# Patient Record
Sex: FEMALE | Race: WHITE | NOT HISPANIC OR LATINO | Employment: OTHER | URBAN - METROPOLITAN AREA
[De-identification: names, ages, dates, MRNs, and addresses within clinical notes are randomized per-mention and may not be internally consistent; named-entity substitution may affect disease eponyms.]

---

## 2019-12-31 ENCOUNTER — OFFICE VISIT (OUTPATIENT)
Dept: UROLOGY | Facility: CLINIC | Age: 69
End: 2019-12-31
Payer: MEDICARE

## 2019-12-31 ENCOUNTER — LAB VISIT (OUTPATIENT)
Dept: LAB | Facility: HOSPITAL | Age: 69
End: 2019-12-31
Attending: UROLOGY
Payer: MEDICARE

## 2019-12-31 VITALS
SYSTOLIC BLOOD PRESSURE: 138 MMHG | HEART RATE: 72 BPM | DIASTOLIC BLOOD PRESSURE: 66 MMHG | HEIGHT: 60 IN | BODY MASS INDEX: 35.05 KG/M2 | WEIGHT: 178.56 LBS

## 2019-12-31 DIAGNOSIS — N20.0 KIDNEY STONES: ICD-10-CM

## 2019-12-31 DIAGNOSIS — N20.0 KIDNEY STONES: Primary | ICD-10-CM

## 2019-12-31 LAB
ANION GAP SERPL CALC-SCNC: 6 MMOL/L (ref 8–16)
BACTERIA #/AREA URNS AUTO: ABNORMAL /HPF
BASOPHILS # BLD AUTO: 0.02 K/UL (ref 0–0.2)
BASOPHILS NFR BLD: 0.3 % (ref 0–1.9)
BILIRUB UR QL STRIP: NEGATIVE
BUN SERPL-MCNC: 13 MG/DL (ref 8–23)
CALCIUM SERPL-MCNC: 9.3 MG/DL (ref 8.7–10.5)
CHLORIDE SERPL-SCNC: 106 MMOL/L (ref 95–110)
CLARITY UR REFRACT.AUTO: ABNORMAL
CO2 SERPL-SCNC: 29 MMOL/L (ref 23–29)
COLOR UR AUTO: YELLOW
CREAT SERPL-MCNC: 0.8 MG/DL (ref 0.5–1.4)
DIFFERENTIAL METHOD: ABNORMAL
EOSINOPHIL # BLD AUTO: 0 K/UL (ref 0–0.5)
EOSINOPHIL NFR BLD: 0.2 % (ref 0–8)
ERYTHROCYTE [DISTWIDTH] IN BLOOD BY AUTOMATED COUNT: 12.4 % (ref 11.5–14.5)
EST. GFR  (AFRICAN AMERICAN): >60 ML/MIN/1.73 M^2
EST. GFR  (NON AFRICAN AMERICAN): >60 ML/MIN/1.73 M^2
GLUCOSE SERPL-MCNC: 103 MG/DL (ref 70–110)
GLUCOSE UR QL STRIP: NEGATIVE
HCT VFR BLD AUTO: 42.3 % (ref 37–48.5)
HGB BLD-MCNC: 13 G/DL (ref 12–16)
HGB UR QL STRIP: ABNORMAL
IMM GRANULOCYTES # BLD AUTO: 0.01 K/UL (ref 0–0.04)
IMM GRANULOCYTES NFR BLD AUTO: 0.2 % (ref 0–0.5)
KETONES UR QL STRIP: NEGATIVE
LEUKOCYTE ESTERASE UR QL STRIP: ABNORMAL
LYMPHOCYTES # BLD AUTO: 2 K/UL (ref 1–4.8)
LYMPHOCYTES NFR BLD: 30.3 % (ref 18–48)
MCH RBC QN AUTO: 27.9 PG (ref 27–31)
MCHC RBC AUTO-ENTMCNC: 30.7 G/DL (ref 32–36)
MCV RBC AUTO: 91 FL (ref 82–98)
MICROSCOPIC COMMENT: ABNORMAL
MONOCYTES # BLD AUTO: 0.5 K/UL (ref 0.3–1)
MONOCYTES NFR BLD: 7.1 % (ref 4–15)
NEUTROPHILS # BLD AUTO: 4.1 K/UL (ref 1.8–7.7)
NEUTROPHILS NFR BLD: 61.9 % (ref 38–73)
NITRITE UR QL STRIP: NEGATIVE
NRBC BLD-RTO: 0 /100 WBC
PH UR STRIP: 5 [PH] (ref 5–8)
PLATELET # BLD AUTO: 170 K/UL (ref 150–350)
PMV BLD AUTO: 11.9 FL (ref 9.2–12.9)
POTASSIUM SERPL-SCNC: 4 MMOL/L (ref 3.5–5.1)
PROT UR QL STRIP: NEGATIVE
RBC # BLD AUTO: 4.66 M/UL (ref 4–5.4)
RBC #/AREA URNS AUTO: 4 /HPF (ref 0–4)
SODIUM SERPL-SCNC: 141 MMOL/L (ref 136–145)
SP GR UR STRIP: 1.02 (ref 1–1.03)
SQUAMOUS #/AREA URNS AUTO: 2 /HPF
URN SPEC COLLECT METH UR: ABNORMAL
WBC # BLD AUTO: 6.63 K/UL (ref 3.9–12.7)
WBC #/AREA URNS AUTO: 66 /HPF (ref 0–5)

## 2019-12-31 PROCEDURE — 85025 COMPLETE CBC W/AUTO DIFF WBC: CPT

## 2019-12-31 PROCEDURE — 99204 PR OFFICE/OUTPT VISIT, NEW, LEVL IV, 45-59 MIN: ICD-10-PCS | Mod: S$GLB,,, | Performed by: UROLOGY

## 2019-12-31 PROCEDURE — 36415 COLL VENOUS BLD VENIPUNCTURE: CPT

## 2019-12-31 PROCEDURE — 99999 PR PBB SHADOW E&M-NEW PATIENT-LVL III: ICD-10-PCS | Mod: PBBFAC,,, | Performed by: UROLOGY

## 2019-12-31 PROCEDURE — 99999 PR PBB SHADOW E&M-NEW PATIENT-LVL III: CPT | Mod: PBBFAC,,, | Performed by: UROLOGY

## 2019-12-31 PROCEDURE — 1159F PR MEDICATION LIST DOCUMENTED IN MEDICAL RECORD: ICD-10-PCS | Mod: S$GLB,,, | Performed by: UROLOGY

## 2019-12-31 PROCEDURE — 1101F PR PT FALLS ASSESS DOC 0-1 FALLS W/OUT INJ PAST YR: ICD-10-PCS | Mod: CPTII,S$GLB,, | Performed by: UROLOGY

## 2019-12-31 PROCEDURE — 87086 URINE CULTURE/COLONY COUNT: CPT

## 2019-12-31 PROCEDURE — 1126F AMNT PAIN NOTED NONE PRSNT: CPT | Mod: S$GLB,,, | Performed by: UROLOGY

## 2019-12-31 PROCEDURE — 1101F PT FALLS ASSESS-DOCD LE1/YR: CPT | Mod: CPTII,S$GLB,, | Performed by: UROLOGY

## 2019-12-31 PROCEDURE — 81001 URINALYSIS AUTO W/SCOPE: CPT

## 2019-12-31 PROCEDURE — 99204 OFFICE O/P NEW MOD 45 MIN: CPT | Mod: S$GLB,,, | Performed by: UROLOGY

## 2019-12-31 PROCEDURE — 87186 SC STD MICRODIL/AGAR DIL: CPT

## 2019-12-31 PROCEDURE — 87088 URINE BACTERIA CULTURE: CPT

## 2019-12-31 PROCEDURE — 80048 BASIC METABOLIC PNL TOTAL CA: CPT

## 2019-12-31 PROCEDURE — 1126F PR PAIN SEVERITY QUANTIFIED, NO PAIN PRESENT: ICD-10-PCS | Mod: S$GLB,,, | Performed by: UROLOGY

## 2019-12-31 PROCEDURE — 87077 CULTURE AEROBIC IDENTIFY: CPT

## 2019-12-31 PROCEDURE — 1159F MED LIST DOCD IN RCRD: CPT | Mod: S$GLB,,, | Performed by: UROLOGY

## 2019-12-31 RX ORDER — LISINOPRIL 5 MG/1
TABLET ORAL DAILY
COMMUNITY
Start: 2019-10-30

## 2019-12-31 RX ORDER — POLYETHYLENE GLYCOL-3350 AND ELECTROLYTES 236; 6.74; 5.86; 2.97; 22.74 G/274.31G; G/274.31G; G/274.31G; G/274.31G; G/274.31G
POWDER, FOR SOLUTION ORAL
COMMUNITY
Start: 2019-09-25 | End: 2020-02-03 | Stop reason: CLARIF

## 2019-12-31 RX ORDER — ATORVASTATIN CALCIUM 10 MG/1
TABLET, FILM COATED ORAL NIGHTLY
COMMUNITY
Start: 2019-10-31

## 2019-12-31 RX ORDER — PANTOPRAZOLE SODIUM 40 MG/1
TABLET, DELAYED RELEASE ORAL
COMMUNITY
Start: 2019-12-08

## 2019-12-31 RX ORDER — CETIRIZINE HYDROCHLORIDE 10 MG/1
10 TABLET ORAL NIGHTLY
COMMUNITY
Start: 2019-10-28

## 2019-12-31 NOTE — PROGRESS NOTES
Urology - [facility]      SUBJECTIVE:     Chief Complaint: right UPJ stone    History of Present Illness:  Kaylin Freire is a 69 y.o. female who presents to clinic for eval of right UPJ stone. She is New  to our clinic. Referral from CHRISTINE Aguiar Jr., MD     Patient noted to have hydro on lung screening CT and underwent abdominal CT which showed a 2.5 cm left UPJ stone with severe hydro, no renal masses, bilateral kidneys appear mildly atrophic.     She is currently asymptomatic. Has had 1 stone episode 15 years ago which she passed. Both parents have hx of stones. Takes a multivitamin, drinks lots of water.   Complains of frequency and urgency. No gross hematuria.   Hx of recurrent UTIs.    PMHx: HTN, HLD, COPD  PSurgHx: hysterectomy, rotator cuff x2    Review of patient's allergies indicates:   Allergen Reactions    Penicillins Swelling       Past Medical History:   Diagnosis Date    Allergy     COPD (chronic obstructive pulmonary disease)     GERD (gastroesophageal reflux disease)     High cholesterol     HTN (hypertension)     Renal mass      Past Surgical History:   Procedure Laterality Date    BREAST BIOPSY      HYSTERECTOMY      ROTATOR CUFF REPAIR       History reviewed. No pertinent family history.  Social History     Tobacco Use    Smoking status: Never Smoker    Smokeless tobacco: Never Used   Substance Use Topics    Alcohol use: Not Currently     Frequency: Never    Drug use: Never        Review of Systems   Constitutional: Negative for chills and fever.   HENT: Negative for trouble swallowing.    Eyes: Negative for pain.   Respiratory: Negative for shortness of breath.    Cardiovascular: Negative for chest pain and palpitations.   Gastrointestinal: Negative for abdominal pain, nausea and vomiting.   Genitourinary: Positive for frequency and urgency. Negative for difficulty urinating and hematuria.   Neurological: Negative for weakness.   Psychiatric/Behavioral: Negative for  behavioral problems.       OBJECTIVE:     Anticoagulation:  Yes aspirin 81 mg    Estimated body mass index is 34.88 kg/m² as calculated from the following:    Height as of this encounter: 5' (1.524 m).    Weight as of this encounter: 81 kg (178 lb 9.2 oz).    Vital Signs (Most Recent)  Pulse: 72 (12/31/19 0925)  BP: 138/66 (12/31/19 0925)    Physical Exam   Constitutional: She is oriented to person, place, and time. She appears well-developed and well-nourished. No distress.   HENT:   Head: Normocephalic and atraumatic.   Eyes: No scleral icterus.   Neck: No JVD present.   Cardiovascular: Normal rate and regular rhythm.    Pulmonary/Chest: Effort normal. No respiratory distress.   Abdominal: Soft. She exhibits no distension. There is no tenderness.   Genitourinary:   Genitourinary Comments: No CVA tenderness bilaterally   Neurological: She is alert and oriented to person, place, and time.   Skin: She is not diaphoretic.     Psychiatric: She has a normal mood and affect. Her behavior is normal. Thought content normal.     Imaging:  Per HPI    ASSESSMENT     1. Kidney stones      PLAN:     - Discussed options for stone management. Recommended PCNL given size of stone. Patient is agreeable to this.   - I have explained the indication, risks, benefits, and alternatives of the procedure in detail.  The patient voices understanding and all questions have been answered.   Risks including but not limited to bleeding, infection, injury to the lung, liver, spleen, colon, need for additional procedures, incomplete removal of stone, arteriovenous malformation, pseudoaneurysm, hydrothorax or pneumothorax, urinoma, ureteral injury or perforation, DVT, PE, heart attack, stroke, and death were discussed with the patient in depth.  The patient agrees to proceed as planned with left PCNL.  - Will meet with Radha today to discuss scheduling her surgery.  - CBC, BMP, UA, urine culture today    Esperanza Lopez MD     Letter to Cosme  CHRISTINE Khanna Jr., MD

## 2019-12-31 NOTE — LETTER
December 31, 2019      CHRISTINE Aguiar Jr., MD  [address] [place] [address]           [facility]  [address]  [facility] [address]  Phone: [phone]          Patient: Kaylin Freire   MR Number: [id number]   YOB: 1950   Date of Visit: 12/31/2019       Dear Dr. CHRISTINE Aguiar Jr.:    Thank you for referring Kaylin Freire to me for evaluation. Attached you will find relevant portions of my assessment and plan of care.    If you have questions, please do not hesitate to call me. I look forward to following Kaylin Freire along with you.    Sincerely,    Arjun Montejo MD    Enclosure  CC:  No Recipients    If you would like to receive this communication electronically, please contact [email or web address] or [phone] to request more information on [employer] access.    For providers and/or their staff who would like to refer a patient to [facility], please contact us through our one-stop-shop provider referral line, [facility], at [phone].    If you feel you have received this communication in error or would no longer like to receive these types of communications, please e-mail [email or web address]

## 2020-01-02 ENCOUNTER — TELEPHONE (OUTPATIENT)
Dept: UROLOGY | Facility: CLINIC | Age: 70
End: 2020-01-02

## 2020-01-02 RX ORDER — CIPROFLOXACIN 500 MG/1
500 TABLET ORAL 2 TIMES DAILY
Qty: 28 TABLET | Refills: 0 | Status: SHIPPED | OUTPATIENT
Start: 2020-01-02 | End: 2020-01-16

## 2020-01-02 NOTE — TELEPHONE ENCOUNTER
Spoke to pt. Informed of postiove urine culture and that Rx was sent to pharmacy to start taking.  anthony spears lpn

## 2020-01-03 LAB — BACTERIA UR CULT: ABNORMAL

## 2020-01-15 ENCOUNTER — TELEPHONE (OUTPATIENT)
Dept: INTERVENTIONAL RADIOLOGY/VASCULAR | Facility: HOSPITAL | Age: 70
End: 2020-01-15

## 2020-01-16 ENCOUNTER — TELEPHONE (OUTPATIENT)
Dept: UROLOGY | Facility: CLINIC | Age: 70
End: 2020-01-16

## 2020-01-16 DIAGNOSIS — N20.0 KIDNEY STONES: Primary | ICD-10-CM

## 2020-02-03 RX ORDER — ASPIRIN 81 MG/1
81 TABLET ORAL DAILY
COMMUNITY
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
## 2020-02-04 DIAGNOSIS — N39.0 URINARY TRACT INFECTION WITHOUT HEMATURIA, SITE UNSPECIFIED: ICD-10-CM

## 2020-02-04 DIAGNOSIS — Z01.818 PREOP TESTING: Primary | ICD-10-CM

## 2020-02-12 ENCOUNTER — OFFICE VISIT (OUTPATIENT)
Dept: UROLOGY | Facility: CLINIC | Age: 70
DRG: 661 | End: 2020-02-12
Payer: MEDICARE

## 2020-02-12 DIAGNOSIS — N20.1 LEFT URETERAL STONE: Primary | ICD-10-CM

## 2020-02-12 PROCEDURE — 99499 NO LOS: ICD-10-PCS | Mod: S$GLB,,, | Performed by: UROLOGY

## 2020-02-12 PROCEDURE — 99999 PR PBB SHADOW E&M-EST. PATIENT-LVL II: CPT | Mod: PBBFAC,,,

## 2020-02-12 PROCEDURE — 87086 URINE CULTURE/COLONY COUNT: CPT

## 2020-02-12 PROCEDURE — 99499 UNLISTED E&M SERVICE: CPT | Mod: S$GLB,,, | Performed by: UROLOGY

## 2020-02-12 PROCEDURE — 99999 PR PBB SHADOW E&M-EST. PATIENT-LVL II: ICD-10-PCS | Mod: PBBFAC,,,

## 2020-02-12 NOTE — PROGRESS NOTES
Urology ([facility]) H&P  Staff: Arjun Montejo MD    CC: Left UPJ stone    HPI:  Kaylin Freire is a 69 y.o. female with a left UPJ stone presenting pre-op for left PCNL.    Patient has a 2.4 cm left UPJ stone with severe proximal hydronephrosis. She denies flank pain, suprapubic pain, dysuria, gross hematuria. She has OAB. She endorses daytime frequency x5-6/day, urgency, UUI x1-2/day, and nocturia x2. She has to change her depends 3x per day. She has tried and failed two separate OAB meds.    She has a history of HTN, HLD, GERD, and COPD. Denies dyspnea or angina on exertion.  Former 1.5 ppd smoker for 30 years. Quit 7 years ago.  No prior stone surgeries. Prior abdominal surgeries include open appendectomy and open partial hysterectomy.    ROS:  Neg except per HPI    Past Medical History:   Diagnosis Date    Allergy     COPD (chronic obstructive pulmonary disease)     General anesthetics causing adverse effect in therapeutic use     Sometimes hard to wake up from anesthesia-following left Rotator cuff sx    GERD (gastroesophageal reflux disease)     High cholesterol     HTN (hypertension)     Renal mass        Past Surgical History:   Procedure Laterality Date    APPENDECTOMY      BREAST BIOPSY      HYSTERECTOMY      ROTATOR CUFF REPAIR Bilateral        Social History     Socioeconomic History    Marital status: Unknown     Spouse name: Not on file    Number of children: Not on file    Years of education: Not on file    Highest education level: Not on file   Occupational History    Not on file   Social Needs    Financial resource strain: Not on file    Food insecurity:     Worry: Not on file     Inability: Not on file    Transportation needs:     Medical: Not on file     Non-medical: Not on file   Tobacco Use    Smoking status: Former Smoker     Packs/day: 1.50     Years: 35.00     Pack years: 52.50     Types: Cigarettes    Smokeless tobacco: Never Used    Tobacco comment: quit smoking x7  years ago   Substance and Sexual Activity    Alcohol use: Never     Frequency: Never    Drug use: Never    Sexual activity: Not on file   Lifestyle    Physical activity:     Days per week: Not on file     Minutes per session: Not on file    Stress: Not on file   Relationships    Social connections:     Talks on phone: Not on file     Gets together: Not on file     Attends Sabianist service: Not on file     Active member of club or organization: Not on file     Attends meetings of clubs or organizations: Not on file     Relationship status: Not on file   Other Topics Concern    Not on file   Social History Narrative    Not on file       Family History   Problem Relation Age of Onset    Anesthesia problems Neg Hx        Review of patient's allergies indicates:   Allergen Reactions    Penicillins Swelling    Adhesive Rash       Current Outpatient Medications on File Prior to Visit   Medication Sig Dispense Refill    aspirin (ECOTRIN) 81 MG EC tablet Take 81 mg by mouth once daily.      atorvastatin (LIPITOR) 10 MG tablet every evening.       cetirizine (ZYRTEC) 10 MG tablet Take 10 mg by mouth nightly.      lisinopril (PRINIVIL,ZESTRIL) 5 MG tablet once daily.       pantoprazole (PROTONIX) 40 MG tablet        No current facility-administered medications on file prior to visit.        Anticoagulation:  Yes - ASA 81 for prevention    Physical Exam:  General: No acute distress, well developed. AAOx3  Head: Normocephalic, Atraumatic  Eyes: Extra-occular movements intact, No discharge  Neck: supple, symmetrical, trachea midline  Lungs: normal respiratory effort, no respiratory distress, no wheezes  CV: regular rate, 2+ pulses  Abdomen: soft, non-tender, non-distended, no organomegaly, no CVA tenderness bilaterally  MSK: no edema, no deformities, normal ROM  Skin: skin color, texture, turgor normal.  Neurologic: no focal deficits, sensation intact    Labs:    Urine dipstick today - Negative for all tested  components.    Urine culture (12/31/19) - Pansensitive E coli    Lab Results   Component Value Date    WBC 6.63 12/31/2019    HGB 13.0 12/31/2019    HCT 42.3 12/31/2019    MCV 91 12/31/2019     12/31/2019           BMP  Lab Results   Component Value Date     12/31/2019    K 4.0 12/31/2019     12/31/2019    CO2 29 12/31/2019    BUN 13 12/31/2019    CREATININE 0.8 12/31/2019    CALCIUM 9.3 12/31/2019    ANIONGAP 6 (L) 12/31/2019    ESTGFRAFRICA >60.0 12/31/2019    EGFRNONAA >60.0 12/31/2019       Imaging:    CT Abdomen/Pelvis without Contrast (12/6/19):  - Bilateral adrenals unremarkable  - 21x24 mm left UPJ stone with proximal severe hydronephrosis, 1423 HU, SSD 14.7 mm  - 5.1x4.7 mm smaller left UPJ stone  - No left renal masses  - No right renal stones, masses, or hydronephrosis  - Right ureter normal in course and caliber  - Bladder uremarkable    Assessment: Kaylin Freire is a 69 y.o. female with a left UPJ stone presenting pre-op for left PCNL.    Plan:     1. To OR on 2/14/20 for left PCNL.  2. Consents signed   3. I have explained the risk, benefits, and alternatives of the procedure in detail. The patient voices understanding and all questions have been answered. The patient agrees to proceed as planned.   4. Urine sent for culture  5. Pre-operative cardiology clearance and aspirin instructions uploaded in media tab. She is low cardiac risk and may hold ASA 81 for 5-7 days pre-op.  6. Last took ASA 81 on Monday, 2/10 which will have her off of ASA for only 4 full days pre-op. After discussion with Dr. Montejo, still OK to proceed with surgery despite this.    Raphael Esparza MD

## 2020-02-12 NOTE — H&P (VIEW-ONLY)
Urology ([facility]) H&P  Staff: Arjun Montejo MD    CC: Left UPJ stone    HPI:  Kaylin Freire is a 69 y.o. female with a left UPJ stone presenting pre-op for left PCNL.    Patient has a 2.4 cm left UPJ stone with severe proximal hydronephrosis. She denies flank pain, suprapubic pain, dysuria, gross hematuria. She has OAB. She endorses daytime frequency x5-6/day, urgency, UUI x1-2/day, and nocturia x2. She has to change her depends 3x per day. She has tried and failed two separate OAB meds.    She has a history of HTN, HLD, GERD, and COPD. Denies dyspnea or angina on exertion.  Former 1.5 ppd smoker for 30 years. Quit 7 years ago.  No prior stone surgeries. Prior abdominal surgeries include open appendectomy and open partial hysterectomy.    ROS:  Neg except per HPI    Past Medical History:   Diagnosis Date    Allergy     COPD (chronic obstructive pulmonary disease)     General anesthetics causing adverse effect in therapeutic use     Sometimes hard to wake up from anesthesia-following left Rotator cuff sx    GERD (gastroesophageal reflux disease)     High cholesterol     HTN (hypertension)     Renal mass        Past Surgical History:   Procedure Laterality Date    APPENDECTOMY      BREAST BIOPSY      HYSTERECTOMY      ROTATOR CUFF REPAIR Bilateral        Social History     Socioeconomic History    Marital status: Unknown     Spouse name: Not on file    Number of children: Not on file    Years of education: Not on file    Highest education level: Not on file   Occupational History    Not on file   Social Needs    Financial resource strain: Not on file    Food insecurity:     Worry: Not on file     Inability: Not on file    Transportation needs:     Medical: Not on file     Non-medical: Not on file   Tobacco Use    Smoking status: Former Smoker     Packs/day: 1.50     Years: 35.00     Pack years: 52.50     Types: Cigarettes    Smokeless tobacco: Never Used    Tobacco comment: quit smoking x7  years ago   Substance and Sexual Activity    Alcohol use: Never     Frequency: Never    Drug use: Never    Sexual activity: Not on file   Lifestyle    Physical activity:     Days per week: Not on file     Minutes per session: Not on file    Stress: Not on file   Relationships    Social connections:     Talks on phone: Not on file     Gets together: Not on file     Attends Yazdanism service: Not on file     Active member of club or organization: Not on file     Attends meetings of clubs or organizations: Not on file     Relationship status: Not on file   Other Topics Concern    Not on file   Social History Narrative    Not on file       Family History   Problem Relation Age of Onset    Anesthesia problems Neg Hx        Review of patient's allergies indicates:   Allergen Reactions    Penicillins Swelling    Adhesive Rash       Current Outpatient Medications on File Prior to Visit   Medication Sig Dispense Refill    aspirin (ECOTRIN) 81 MG EC tablet Take 81 mg by mouth once daily.      atorvastatin (LIPITOR) 10 MG tablet every evening.       cetirizine (ZYRTEC) 10 MG tablet Take 10 mg by mouth nightly.      lisinopril (PRINIVIL,ZESTRIL) 5 MG tablet once daily.       pantoprazole (PROTONIX) 40 MG tablet        No current facility-administered medications on file prior to visit.        Anticoagulation:  Yes - ASA 81 for prevention    Physical Exam:  General: No acute distress, well developed. AAOx3  Head: Normocephalic, Atraumatic  Eyes: Extra-occular movements intact, No discharge  Neck: supple, symmetrical, trachea midline  Lungs: normal respiratory effort, no respiratory distress, no wheezes  CV: regular rate, 2+ pulses  Abdomen: soft, non-tender, non-distended, no organomegaly, no CVA tenderness bilaterally  MSK: no edema, no deformities, normal ROM  Skin: skin color, texture, turgor normal.  Neurologic: no focal deficits, sensation intact    Labs:    Urine dipstick today - Negative for all tested  components.    Urine culture (12/31/19) - Pansensitive E coli    Lab Results   Component Value Date    WBC 6.63 12/31/2019    HGB 13.0 12/31/2019    HCT 42.3 12/31/2019    MCV 91 12/31/2019     12/31/2019           BMP  Lab Results   Component Value Date     12/31/2019    K 4.0 12/31/2019     12/31/2019    CO2 29 12/31/2019    BUN 13 12/31/2019    CREATININE 0.8 12/31/2019    CALCIUM 9.3 12/31/2019    ANIONGAP 6 (L) 12/31/2019    ESTGFRAFRICA >60.0 12/31/2019    EGFRNONAA >60.0 12/31/2019       Imaging:    CT Abdomen/Pelvis without Contrast (12/6/19):  - Bilateral adrenals unremarkable  - 21x24 mm left UPJ stone with proximal severe hydronephrosis, 1423 HU, SSD 14.7 mm  - 5.1x4.7 mm smaller left UPJ stone  - No left renal masses  - No right renal stones, masses, or hydronephrosis  - Right ureter normal in course and caliber  - Bladder uremarkable    Assessment: Kaylin Freire is a 69 y.o. female with a left UPJ stone presenting pre-op for left PCNL.    Plan:     1. To OR on 2/14/20 for left PCNL.  2. Consents signed   3. I have explained the risk, benefits, and alternatives of the procedure in detail. The patient voices understanding and all questions have been answered. The patient agrees to proceed as planned.   4. Urine sent for culture  5. Pre-operative cardiology clearance and aspirin instructions uploaded in media tab. She is low cardiac risk and may hold ASA 81 for 5-7 days pre-op.  6. Last took ASA 81 on Monday, 2/10 which will have her off of ASA for only 4 full days pre-op. After discussion with Dr. Montejo, still OK to proceed with surgery despite this.    Raphael Esparza MD

## 2020-02-13 ENCOUNTER — TELEPHONE (OUTPATIENT)
Dept: UROLOGY | Facility: CLINIC | Age: 70
End: 2020-02-13

## 2020-02-13 ENCOUNTER — ANESTHESIA EVENT (OUTPATIENT)
Dept: SURGERY | Facility: HOSPITAL | Age: 70
DRG: 661 | End: 2020-02-13
Payer: MEDICARE

## 2020-02-13 LAB — BACTERIA UR CULT: NO GROWTH

## 2020-02-13 RX ORDER — FENTANYL CITRATE 50 UG/ML
50 INJECTION, SOLUTION INTRAMUSCULAR; INTRAVENOUS
Status: CANCELLED | OUTPATIENT
Start: 2020-02-13

## 2020-02-13 RX ORDER — MIDAZOLAM HYDROCHLORIDE 1 MG/ML
1 INJECTION INTRAMUSCULAR; INTRAVENOUS
Status: CANCELLED | OUTPATIENT
Start: 2020-02-13

## 2020-02-14 ENCOUNTER — HOSPITAL ENCOUNTER (INPATIENT)
Facility: HOSPITAL | Age: 70
LOS: 2 days | Discharge: HOME | DRG: 661 | End: 2020-02-16
Attending: UROLOGY | Admitting: UROLOGY
Payer: MEDICARE

## 2020-02-14 ENCOUNTER — ANESTHESIA (OUTPATIENT)
Dept: SURGERY | Facility: HOSPITAL | Age: 70
DRG: 661 | End: 2020-02-14
Payer: MEDICARE

## 2020-02-14 DIAGNOSIS — N20.0 KIDNEY STONES: Primary | ICD-10-CM

## 2020-02-14 DIAGNOSIS — Z01.818 PREOP TESTING: ICD-10-CM

## 2020-02-14 PROBLEM — N20.1 LEFT URETERAL STONE: Status: ACTIVE | Noted: 2020-02-14

## 2020-02-14 LAB
ABO + RH BLD: NORMAL
ALBUMIN SERPL BCP-MCNC: 4.4 G/DL (ref 3.5–5.2)
ALP SERPL-CCNC: 95 U/L (ref 55–135)
ALT SERPL W/O P-5'-P-CCNC: 11 U/L (ref 10–44)
ANION GAP SERPL CALC-SCNC: 7 MMOL/L (ref 8–16)
ANION GAP SERPL CALC-SCNC: 9 MMOL/L (ref 8–16)
AST SERPL-CCNC: 15 U/L (ref 10–40)
BASOPHILS # BLD AUTO: 0.01 K/UL (ref 0–0.2)
BASOPHILS NFR BLD: 0.1 % (ref 0–1.9)
BILIRUB SERPL-MCNC: 0.5 MG/DL (ref 0.1–1)
BLD GP AB SCN CELLS X3 SERPL QL: NORMAL
BUN SERPL-MCNC: 11 MG/DL (ref 8–23)
BUN SERPL-MCNC: 12 MG/DL (ref 8–23)
CALCIUM SERPL-MCNC: 8.6 MG/DL (ref 8.7–10.5)
CALCIUM SERPL-MCNC: 9.8 MG/DL (ref 8.7–10.5)
CHLORIDE SERPL-SCNC: 103 MMOL/L (ref 95–110)
CHLORIDE SERPL-SCNC: 109 MMOL/L (ref 95–110)
CO2 SERPL-SCNC: 24 MMOL/L (ref 23–29)
CO2 SERPL-SCNC: 27 MMOL/L (ref 23–29)
CREAT SERPL-MCNC: 0.8 MG/DL (ref 0.5–1.4)
CREAT SERPL-MCNC: 0.8 MG/DL (ref 0.5–1.4)
DIFFERENTIAL METHOD: ABNORMAL
EOSINOPHIL # BLD AUTO: 0 K/UL (ref 0–0.5)
EOSINOPHIL NFR BLD: 0 % (ref 0–8)
ERYTHROCYTE [DISTWIDTH] IN BLOOD BY AUTOMATED COUNT: 12.4 % (ref 11.5–14.5)
EST. GFR  (AFRICAN AMERICAN): >60 ML/MIN/1.73 M^2
EST. GFR  (AFRICAN AMERICAN): >60 ML/MIN/1.73 M^2
EST. GFR  (NON AFRICAN AMERICAN): >60 ML/MIN/1.73 M^2
EST. GFR  (NON AFRICAN AMERICAN): >60 ML/MIN/1.73 M^2
GLUCOSE SERPL-MCNC: 137 MG/DL (ref 70–110)
GLUCOSE SERPL-MCNC: 95 MG/DL (ref 70–110)
HCT VFR BLD AUTO: 40.7 % (ref 37–48.5)
HGB BLD-MCNC: 12.5 G/DL (ref 12–16)
IMM GRANULOCYTES # BLD AUTO: 0.04 K/UL (ref 0–0.04)
IMM GRANULOCYTES NFR BLD AUTO: 0.4 % (ref 0–0.5)
INR PPP: 1.2 (ref 0.8–1.2)
LYMPHOCYTES # BLD AUTO: 1.3 K/UL (ref 1–4.8)
LYMPHOCYTES NFR BLD: 14.2 % (ref 18–48)
MCH RBC QN AUTO: 27.9 PG (ref 27–31)
MCHC RBC AUTO-ENTMCNC: 30.7 G/DL (ref 32–36)
MCV RBC AUTO: 91 FL (ref 82–98)
MONOCYTES # BLD AUTO: 0.4 K/UL (ref 0.3–1)
MONOCYTES NFR BLD: 3.9 % (ref 4–15)
NEUTROPHILS # BLD AUTO: 7.2 K/UL (ref 1.8–7.7)
NEUTROPHILS NFR BLD: 81.4 % (ref 38–73)
NRBC BLD-RTO: 0 /100 WBC
PLATELET # BLD AUTO: 180 K/UL (ref 150–350)
PMV BLD AUTO: 11.4 FL (ref 9.2–12.9)
POTASSIUM SERPL-SCNC: 3.9 MMOL/L (ref 3.5–5.1)
POTASSIUM SERPL-SCNC: 4.3 MMOL/L (ref 3.5–5.1)
PROT SERPL-MCNC: 7.6 G/DL (ref 6–8.4)
PROTHROMBIN TIME: 12.1 SEC (ref 9–12.5)
RBC # BLD AUTO: 4.48 M/UL (ref 4–5.4)
SODIUM SERPL-SCNC: 139 MMOL/L (ref 136–145)
SODIUM SERPL-SCNC: 140 MMOL/L (ref 136–145)
WBC # BLD AUTO: 8.89 K/UL (ref 3.9–12.7)

## 2020-02-14 PROCEDURE — 63600175 PHARM REV CODE 636 W HCPCS: Performed by: STUDENT IN AN ORGANIZED HEALTH CARE EDUCATION/TRAINING PROGRAM

## 2020-02-14 PROCEDURE — 63600175 PHARM REV CODE 636 W HCPCS: Performed by: ANESTHESIOLOGY

## 2020-02-14 PROCEDURE — C1769 GUIDE WIRE: HCPCS | Performed by: UROLOGY

## 2020-02-14 PROCEDURE — 64461 PVB THORACIC SINGLE INJ SITE: CPT | Performed by: STUDENT IN AN ORGANIZED HEALTH CARE EDUCATION/TRAINING PROGRAM

## 2020-02-14 PROCEDURE — 71000016 HC POSTOP RECOV ADDL HR: Performed by: UROLOGY

## 2020-02-14 PROCEDURE — 37000009 HC ANESTHESIA EA ADD 15 MINS: Performed by: UROLOGY

## 2020-02-14 PROCEDURE — D9220A PRA ANESTHESIA: ICD-10-PCS | Mod: CRNA,,, | Performed by: NURSE ANESTHETIST, CERTIFIED REGISTERED

## 2020-02-14 PROCEDURE — 25000003 PHARM REV CODE 250: Performed by: STUDENT IN AN ORGANIZED HEALTH CARE EDUCATION/TRAINING PROGRAM

## 2020-02-14 PROCEDURE — 64461 PVB THORACIC SINGLE INJ SITE: CPT | Mod: 59,LT,, | Performed by: ANESTHESIOLOGY

## 2020-02-14 PROCEDURE — 50081 PERQ NL/PL LITHOTRP CPLX>2CM: CPT | Mod: LT,,, | Performed by: UROLOGY

## 2020-02-14 PROCEDURE — 85025 COMPLETE CBC W/AUTO DIFF WBC: CPT

## 2020-02-14 PROCEDURE — 63600175 PHARM REV CODE 636 W HCPCS: Performed by: FAMILY MEDICINE

## 2020-02-14 PROCEDURE — 82365 CALCULUS SPECTROSCOPY: CPT

## 2020-02-14 PROCEDURE — 25000003 PHARM REV CODE 250: Performed by: ANESTHESIOLOGY

## 2020-02-14 PROCEDURE — 76942 ECHO GUIDE FOR BIOPSY: CPT | Performed by: STUDENT IN AN ORGANIZED HEALTH CARE EDUCATION/TRAINING PROGRAM

## 2020-02-14 PROCEDURE — 80048 BASIC METABOLIC PNL TOTAL CA: CPT

## 2020-02-14 PROCEDURE — 27200750 HC INSULATED NEEDLE/ STIMUPLEX: Performed by: STUDENT IN AN ORGANIZED HEALTH CARE EDUCATION/TRAINING PROGRAM

## 2020-02-14 PROCEDURE — 71000033 HC RECOVERY, INTIAL HOUR: Performed by: UROLOGY

## 2020-02-14 PROCEDURE — S0020 INJECTION, BUPIVICAINE HYDRO: HCPCS | Performed by: ANESTHESIOLOGY

## 2020-02-14 PROCEDURE — 37000008 HC ANESTHESIA 1ST 15 MINUTES: Performed by: UROLOGY

## 2020-02-14 PROCEDURE — 36415 COLL VENOUS BLD VENIPUNCTURE: CPT

## 2020-02-14 PROCEDURE — 94761 N-INVAS EAR/PLS OXIMETRY MLT: CPT

## 2020-02-14 PROCEDURE — 25500020 PHARM REV CODE 255: Performed by: UROLOGY

## 2020-02-14 PROCEDURE — 85610 PROTHROMBIN TIME: CPT

## 2020-02-14 PROCEDURE — 64461 ERECTOR SPINAE PLANE SINGLE INJECTION BLOCK: ICD-10-PCS | Mod: 59,LT,, | Performed by: ANESTHESIOLOGY

## 2020-02-14 PROCEDURE — 36000708 HC OR TIME LEV III 1ST 15 MIN: Performed by: UROLOGY

## 2020-02-14 PROCEDURE — 63600175 PHARM REV CODE 636 W HCPCS

## 2020-02-14 PROCEDURE — 63600175 PHARM REV CODE 636 W HCPCS: Performed by: NURSE ANESTHETIST, CERTIFIED REGISTERED

## 2020-02-14 PROCEDURE — C1758 CATHETER, URETERAL: HCPCS | Performed by: UROLOGY

## 2020-02-14 PROCEDURE — 86901 BLOOD TYPING SEROLOGIC RH(D): CPT

## 2020-02-14 PROCEDURE — 80053 COMPREHEN METABOLIC PANEL: CPT

## 2020-02-14 PROCEDURE — 50435 EXCHANGE NEPHROSTOMY CATH: CPT | Mod: 51,LT,, | Performed by: UROLOGY

## 2020-02-14 PROCEDURE — S0077 INJECTION, CLINDAMYCIN PHOSP: HCPCS | Performed by: STUDENT IN AN ORGANIZED HEALTH CARE EDUCATION/TRAINING PROGRAM

## 2020-02-14 PROCEDURE — D9220A PRA ANESTHESIA: Mod: ANES,,, | Performed by: ANESTHESIOLOGY

## 2020-02-14 PROCEDURE — D9220A PRA ANESTHESIA: Mod: CRNA,,, | Performed by: NURSE ANESTHETIST, CERTIFIED REGISTERED

## 2020-02-14 PROCEDURE — 27201423 OPTIME MED/SURG SUP & DEVICES STERILE SUPPLY: Performed by: UROLOGY

## 2020-02-14 PROCEDURE — 25000003 PHARM REV CODE 250: Performed by: NURSE ANESTHETIST, CERTIFIED REGISTERED

## 2020-02-14 PROCEDURE — D9220A PRA ANESTHESIA: ICD-10-PCS | Mod: ANES,,, | Performed by: ANESTHESIOLOGY

## 2020-02-14 PROCEDURE — 71000015 HC POSTOP RECOV 1ST HR: Performed by: UROLOGY

## 2020-02-14 PROCEDURE — C2617 STENT, NON-COR, TEM W/O DEL: HCPCS | Performed by: UROLOGY

## 2020-02-14 PROCEDURE — 50081 PR REMV KID STONE, 2+ CM, PERC: ICD-10-PCS | Mod: LT,,, | Performed by: UROLOGY

## 2020-02-14 PROCEDURE — 50435 PR EXCHANGE NEPHROSTOMY CATH, INCL GUID, S&I: ICD-10-PCS | Mod: 51,LT,, | Performed by: UROLOGY

## 2020-02-14 PROCEDURE — 11000001 HC ACUTE MED/SURG PRIVATE ROOM

## 2020-02-14 PROCEDURE — 36000709 HC OR TIME LEV III EA ADD 15 MIN: Performed by: UROLOGY

## 2020-02-14 DEVICE — STENT URETERAL UNIV 6FR 22CM: Type: IMPLANTABLE DEVICE | Site: URETER | Status: FUNCTIONAL

## 2020-02-14 RX ORDER — FENTANYL CITRATE 50 UG/ML
INJECTION, SOLUTION INTRAMUSCULAR; INTRAVENOUS
Status: DISCONTINUED | OUTPATIENT
Start: 2020-02-14 | End: 2020-02-14

## 2020-02-14 RX ORDER — SODIUM CHLORIDE 0.9 % (FLUSH) 0.9 %
10 SYRINGE (ML) INJECTION
Status: DISCONTINUED | OUTPATIENT
Start: 2020-02-14 | End: 2020-02-16 | Stop reason: HOSPADM

## 2020-02-14 RX ORDER — GLYCOPYRROLATE 0.2 MG/ML
INJECTION INTRAMUSCULAR; INTRAVENOUS
Status: DISCONTINUED | OUTPATIENT
Start: 2020-02-14 | End: 2020-02-14

## 2020-02-14 RX ORDER — SODIUM CHLORIDE 9 MG/ML
500 INJECTION, SOLUTION INTRAVENOUS ONCE
Status: COMPLETED | OUTPATIENT
Start: 2020-02-14 | End: 2020-02-14

## 2020-02-14 RX ORDER — PREGABALIN 75 MG/1
75 CAPSULE ORAL NIGHTLY
Status: DISCONTINUED | OUTPATIENT
Start: 2020-02-14 | End: 2020-02-16 | Stop reason: HOSPADM

## 2020-02-14 RX ORDER — NEOSTIGMINE METHYLSULFATE 0.5 MG/ML
INJECTION, SOLUTION INTRAVENOUS
Status: DISCONTINUED | OUTPATIENT
Start: 2020-02-14 | End: 2020-02-14

## 2020-02-14 RX ORDER — FENTANYL CITRATE 50 UG/ML
25 INJECTION, SOLUTION INTRAMUSCULAR; INTRAVENOUS EVERY 5 MIN PRN
Status: DISCONTINUED | OUTPATIENT
Start: 2020-02-14 | End: 2020-02-14

## 2020-02-14 RX ORDER — SODIUM CHLORIDE 9 MG/ML
INJECTION, SOLUTION INTRAVENOUS CONTINUOUS PRN
Status: DISCONTINUED | OUTPATIENT
Start: 2020-02-14 | End: 2020-02-14

## 2020-02-14 RX ORDER — ONDANSETRON 8 MG/1
8 TABLET, ORALLY DISINTEGRATING ORAL EVERY 6 HOURS PRN
Status: DISCONTINUED | OUTPATIENT
Start: 2020-02-14 | End: 2020-02-16 | Stop reason: HOSPADM

## 2020-02-14 RX ORDER — FENTANYL CITRATE 50 UG/ML
25 INJECTION, SOLUTION INTRAMUSCULAR; INTRAVENOUS EVERY 5 MIN PRN
Status: DISCONTINUED | OUTPATIENT
Start: 2020-02-14 | End: 2020-02-14 | Stop reason: HOSPADM

## 2020-02-14 RX ORDER — LISINOPRIL 5 MG/1
5 TABLET ORAL DAILY
Status: DISCONTINUED | OUTPATIENT
Start: 2020-02-15 | End: 2020-02-16 | Stop reason: HOSPADM

## 2020-02-14 RX ORDER — CLINDAMYCIN PHOSPHATE 900 MG/50ML
900 INJECTION, SOLUTION INTRAVENOUS
Status: COMPLETED | OUTPATIENT
Start: 2020-02-14 | End: 2020-02-14

## 2020-02-14 RX ORDER — PANTOPRAZOLE SODIUM 40 MG/1
40 TABLET, DELAYED RELEASE ORAL DAILY
Status: DISCONTINUED | OUTPATIENT
Start: 2020-02-15 | End: 2020-02-16 | Stop reason: HOSPADM

## 2020-02-14 RX ORDER — MIDAZOLAM HYDROCHLORIDE 1 MG/ML
0.5 INJECTION INTRAMUSCULAR; INTRAVENOUS
Status: DISCONTINUED | OUTPATIENT
Start: 2020-02-14 | End: 2020-02-14

## 2020-02-14 RX ORDER — SODIUM CHLORIDE 0.9 % (FLUSH) 0.9 %
10 SYRINGE (ML) INJECTION
Status: DISCONTINUED | OUTPATIENT
Start: 2020-02-14 | End: 2020-02-14 | Stop reason: HOSPADM

## 2020-02-14 RX ORDER — KETOROLAC TROMETHAMINE 30 MG/ML
15 INJECTION, SOLUTION INTRAMUSCULAR; INTRAVENOUS EVERY 8 HOURS
Status: DISCONTINUED | OUTPATIENT
Start: 2020-02-14 | End: 2020-02-15

## 2020-02-14 RX ORDER — HALOPERIDOL 5 MG/ML
0.5 INJECTION INTRAMUSCULAR
Status: COMPLETED | OUTPATIENT
Start: 2020-02-14 | End: 2020-02-14

## 2020-02-14 RX ORDER — GENTAMICIN SULFATE 80 MG/100ML
80 INJECTION, SOLUTION INTRAVENOUS
Status: DISCONTINUED | OUTPATIENT
Start: 2020-02-14 | End: 2020-02-14

## 2020-02-14 RX ORDER — ONDANSETRON 2 MG/ML
4 INJECTION INTRAMUSCULAR; INTRAVENOUS ONCE AS NEEDED
Status: COMPLETED | OUTPATIENT
Start: 2020-02-14 | End: 2020-02-14

## 2020-02-14 RX ORDER — LIDOCAINE HYDROCHLORIDE 20 MG/ML
INJECTION INTRAVENOUS
Status: DISCONTINUED | OUTPATIENT
Start: 2020-02-14 | End: 2020-02-14

## 2020-02-14 RX ORDER — HALOPERIDOL 5 MG/ML
INJECTION INTRAMUSCULAR
Status: COMPLETED
Start: 2020-02-14 | End: 2020-02-14

## 2020-02-14 RX ORDER — POLYETHYLENE GLYCOL 3350 17 G/17G
17 POWDER, FOR SOLUTION ORAL DAILY
Status: DISCONTINUED | OUTPATIENT
Start: 2020-02-15 | End: 2020-02-16 | Stop reason: HOSPADM

## 2020-02-14 RX ORDER — ATORVASTATIN CALCIUM 10 MG/1
10 TABLET, FILM COATED ORAL NIGHTLY
Status: DISCONTINUED | OUTPATIENT
Start: 2020-02-14 | End: 2020-02-16 | Stop reason: HOSPADM

## 2020-02-14 RX ORDER — PROPOFOL 10 MG/ML
VIAL (ML) INTRAVENOUS
Status: DISCONTINUED | OUTPATIENT
Start: 2020-02-14 | End: 2020-02-14

## 2020-02-14 RX ORDER — OXYCODONE HYDROCHLORIDE 5 MG/1
5 TABLET ORAL EVERY 4 HOURS PRN
Status: DISCONTINUED | OUTPATIENT
Start: 2020-02-14 | End: 2020-02-16 | Stop reason: HOSPADM

## 2020-02-14 RX ORDER — ONDANSETRON 2 MG/ML
4 INJECTION INTRAMUSCULAR; INTRAVENOUS DAILY PRN
Status: DISCONTINUED | OUTPATIENT
Start: 2020-02-14 | End: 2020-02-14 | Stop reason: HOSPADM

## 2020-02-14 RX ORDER — ACETAMINOPHEN 500 MG
1000 TABLET ORAL EVERY 6 HOURS
Status: DISCONTINUED | OUTPATIENT
Start: 2020-02-14 | End: 2020-02-16 | Stop reason: HOSPADM

## 2020-02-14 RX ORDER — SUCCINYLCHOLINE CHLORIDE 20 MG/ML
INJECTION INTRAMUSCULAR; INTRAVENOUS
Status: DISCONTINUED | OUTPATIENT
Start: 2020-02-14 | End: 2020-02-14

## 2020-02-14 RX ORDER — ONDANSETRON 2 MG/ML
INJECTION INTRAMUSCULAR; INTRAVENOUS
Status: DISCONTINUED | OUTPATIENT
Start: 2020-02-14 | End: 2020-02-14

## 2020-02-14 RX ORDER — BUPIVACAINE HYDROCHLORIDE 7.5 MG/ML
INJECTION, SOLUTION EPIDURAL; RETROBULBAR
Status: COMPLETED | OUTPATIENT
Start: 2020-02-14 | End: 2020-02-14

## 2020-02-14 RX ORDER — ROCURONIUM BROMIDE 10 MG/ML
INJECTION, SOLUTION INTRAVENOUS
Status: DISCONTINUED | OUTPATIENT
Start: 2020-02-14 | End: 2020-02-14

## 2020-02-14 RX ORDER — METHOCARBAMOL 500 MG/1
1000 TABLET, FILM COATED ORAL EVERY 6 HOURS PRN
Status: DISCONTINUED | OUTPATIENT
Start: 2020-02-14 | End: 2020-02-16 | Stop reason: HOSPADM

## 2020-02-14 RX ORDER — SODIUM CHLORIDE 9 MG/ML
INJECTION, SOLUTION INTRAVENOUS CONTINUOUS
Status: DISCONTINUED | OUTPATIENT
Start: 2020-02-14 | End: 2020-02-15

## 2020-02-14 RX ORDER — VANCOMYCIN HCL IN 5 % DEXTROSE 1G/250ML
1000 PLASTIC BAG, INJECTION (ML) INTRAVENOUS
Status: COMPLETED | OUTPATIENT
Start: 2020-02-14 | End: 2020-02-14

## 2020-02-14 RX ORDER — ASPIRIN 81 MG/1
81 TABLET ORAL DAILY
Status: DISCONTINUED | OUTPATIENT
Start: 2020-02-15 | End: 2020-02-15

## 2020-02-14 RX ADMIN — SODIUM CHLORIDE 1000 ML: 0.9 INJECTION, SOLUTION INTRAVENOUS at 10:02

## 2020-02-14 RX ADMIN — ROCURONIUM BROMIDE 30 MG: 10 INJECTION, SOLUTION INTRAVENOUS at 03:02

## 2020-02-14 RX ADMIN — OXYCODONE HYDROCHLORIDE 5 MG: 5 TABLET ORAL at 05:02

## 2020-02-14 RX ADMIN — SODIUM CHLORIDE: 0.9 INJECTION, SOLUTION INTRAVENOUS at 05:02

## 2020-02-14 RX ADMIN — CLINDAMYCIN IN 5 PERCENT DEXTROSE 900 MG: 18 INJECTION, SOLUTION INTRAVENOUS at 03:02

## 2020-02-14 RX ADMIN — METHOCARBAMOL TABLETS 1000 MG: 500 TABLET, COATED ORAL at 05:02

## 2020-02-14 RX ADMIN — SUCCINYLCHOLINE CHLORIDE 120 MG: 20 INJECTION, SOLUTION INTRAMUSCULAR; INTRAVENOUS at 02:02

## 2020-02-14 RX ADMIN — HALOPERIDOL LACTATE 0.5 MG: 5 INJECTION, SOLUTION INTRAMUSCULAR at 01:02

## 2020-02-14 RX ADMIN — VANCOMYCIN HYDROCHLORIDE 1000 MG: 1 INJECTION, POWDER, LYOPHILIZED, FOR SOLUTION INTRAVENOUS at 10:02

## 2020-02-14 RX ADMIN — ROCURONIUM BROMIDE 5 MG: 10 INJECTION, SOLUTION INTRAVENOUS at 02:02

## 2020-02-14 RX ADMIN — ATORVASTATIN CALCIUM 10 MG: 10 TABLET, FILM COATED ORAL at 09:02

## 2020-02-14 RX ADMIN — LIDOCAINE HYDROCHLORIDE 100 MG: 20 INJECTION, SOLUTION INTRAVENOUS at 02:02

## 2020-02-14 RX ADMIN — BUPIVACAINE HYDROCHLORIDE 15 ML: 7.5 INJECTION, SOLUTION EPIDURAL; RETROBULBAR at 12:02

## 2020-02-14 RX ADMIN — IOHEXOL 12 ML: 300 INJECTION, SOLUTION INTRAVENOUS at 03:02

## 2020-02-14 RX ADMIN — PREGABALIN 75 MG: 75 CAPSULE ORAL at 09:02

## 2020-02-14 RX ADMIN — GLYCOPYRROLATE 0.6 MG: 0.2 INJECTION, SOLUTION INTRAMUSCULAR; INTRAVENOUS at 04:02

## 2020-02-14 RX ADMIN — ONDANSETRON 4 MG: 2 INJECTION INTRAMUSCULAR; INTRAVENOUS at 04:02

## 2020-02-14 RX ADMIN — GENTAMICIN SULFATE 240 MG: 40 INJECTION, SOLUTION INTRAMUSCULAR; INTRAVENOUS at 03:02

## 2020-02-14 RX ADMIN — ONDANSETRON 4 MG: 2 INJECTION INTRAMUSCULAR; INTRAVENOUS at 12:02

## 2020-02-14 RX ADMIN — GLYCOPYRROLATE 0.1 MG: 0.2 INJECTION, SOLUTION INTRAMUSCULAR; INTRAVENOUS at 03:02

## 2020-02-14 RX ADMIN — KETOROLAC TROMETHAMINE 15 MG: 30 INJECTION, SOLUTION INTRAMUSCULAR at 09:02

## 2020-02-14 RX ADMIN — FENTANYL CITRATE 12.5 MCG: 50 INJECTION, SOLUTION INTRAMUSCULAR; INTRAVENOUS at 02:02

## 2020-02-14 RX ADMIN — ACETAMINOPHEN 1000 MG: 500 TABLET ORAL at 05:02

## 2020-02-14 RX ADMIN — FENTANYL CITRATE 37.5 MCG: 50 INJECTION, SOLUTION INTRAMUSCULAR; INTRAVENOUS at 02:02

## 2020-02-14 RX ADMIN — NEOSTIGMINE METHYLSULFATE 5 MG: 0.5 INJECTION INTRAVENOUS at 04:02

## 2020-02-14 RX ADMIN — FENTANYL CITRATE 25 MCG: 50 INJECTION, SOLUTION INTRAMUSCULAR; INTRAVENOUS at 03:02

## 2020-02-14 RX ADMIN — FENTANYL CITRATE 100 MCG: 50 INJECTION INTRAMUSCULAR; INTRAVENOUS at 11:02

## 2020-02-14 RX ADMIN — PROPOFOL 150 MG: 10 INJECTION, EMULSION INTRAVENOUS at 02:02

## 2020-02-14 RX ADMIN — SODIUM CHLORIDE: 0.9 INJECTION, SOLUTION INTRAVENOUS at 02:02

## 2020-02-14 NOTE — ANESTHESIA PROCEDURE NOTES
Intubation  Performed by: Anay Montanez CRNA  Authorized by: Anay Montanez CRNA     Intubation:     Induction:  Rapid sequence induction    Intubated:  Postinduction    Mask Ventilation:  N/a    Attempts:  1    Attempted By:  CRNA    Method of Intubation:  Direct    Blade:  Conte 2    Laryngeal View Grade: Grade I - full view of chords      Difficult Airway Encountered?: No      Airway Device:  Oral endotracheal tube    Airway Device Size:  7.5    Style/Cuff Inflation:  Cuffed (inflated to minimal occlusive pressure)    Tube secured:  20    Secured at:  The lips    Placement Verified By:  Capnometry    Complicating Factors:  Obesity and short neck    Findings Post-Intubation:  BS equal bilateral and atraumatic/condition of teeth unchanged

## 2020-02-14 NOTE — OP NOTE
[facility]  Operative Note    Date: 02/14/2020    Pre-Op Diagnosis: Left renal stone    Post-Op Diagnosis: same    Procedure(s) Performed:   1.  Left PCNL, >2cm  2.  Left antegrade JJ ureteral stent placement  3.  Left antegrade nephrostogram  4.  Dilation and establishment of Left percutaneous renal tract  5.  Fluoro < 1 h    Specimen(s): Left renal stone    Staff Surgeon: Arjun Montejo MD    Assistant Surgeon: None    Anesthesia: General endotracheal anesthesia    Indications: Kaylin Freire is a 69 y.o. female with a left renal stones presenting for definitive management.      Findings:  1.  Large, >2cm renal pelvis stone    Estimated Blood Loss: 50cc    Drains:   1.  16 Fr urethral murdock catheter  2.  6 x 22 left JJ ureteral stent    Procedure in detail:  After the risks, benefits and possible complications of the procedure were explained, the patient elected to undergo the above procedures and informed consent was obtained. The patient was taken to the OR suite and placed under anesthesia. Pre-operative antibiotics were administered. Time out was performed.  SCDs were applied and working prior to the procedure.      On the stretcher, a 16 Fr murdock was placed and secured to the patient's leg.      The patient was then placed in prone position upon the OR table and prepped and draped in the usual sterile fashion.  IR achieved access with placement of an amplatz and motion wire into the upper pole from the mid-pole access site. .    A skin incision was made with an 11 blade knife.     The balloon dilator was passed over the amplatz wire.  This was used to dilate a tract percutaneously to the left kidney under fluoroscopic guidance.  The percutaneous sheath was passed over the balloon and into the collecting system.  The balloon was then deflated and removed.    The nephroscope, which had been previously assembled, was advanced into the sheath.  The UPJ was identified.  The stones were identified.   These were broken up using the Shockpulse device.  The stone fragments were removed using the Iieb-E-Fivkit basket.    Flexible pyeloscopy was then performed to evaluate all the calyces. Stone fragments that were visualized were removed with a basket.  There were no stone fragments identified at the end of flexible pyeloscopy.      A 6 x 22 JJ ureteral stent was passed over the super motion wire under direct vision.  When an adequate coil was seen in the bladder using fluoro, the super stiff was removed.  The proximal coil was manipulated into adequate position using a grasper.      The sheath was removed over the catheter leaving the nephrotomy tube in place.  The skin was closed with 3-0 vicryl in a horizontal mattress fashion.      A sterile compressive dressing was placed. The patient was then transferred back to PACU in stable condition.     Disposition:  The patient will be admitted to the floor for postoperative monitoring.      Arjun Montejo MD

## 2020-02-14 NOTE — ANESTHESIA PROCEDURE NOTES
Erector Spinae Plane Single Injection Block    Patient location during procedure: pre-op   Block not for primary anesthetic.  Reason for block: at surgeon's request and post-op pain management   Post-op Pain Location: left abomdinal pain  Start time: 2/14/2020 11:45 AM  Timeout: 2/14/2020 11:45 AM   End time: 2/14/2020 12:00 PM    Staffing  Authorizing Provider: Mikael Davenport MD  Performing Provider: Abimael Lara MD    Preanesthetic Checklist  Completed: patient identified, site marked, surgical consent, pre-op evaluation, timeout performed, IV checked, risks and benefits discussed and monitors and equipment checked  Peripheral Block  Patient position: sitting  Prep: ChloraPrep  Patient monitoring: heart rate, cardiac monitor, continuous pulse ox, continuous capnometry and frequent blood pressure checks  Block type: erector spinae plane (Erector Spinae Plane Block)  Laterality: left  Injection technique: single shot  Location: T7-8  Needle  Needle type: Stimuplex   Needle gauge: 17 G  Needle length: 3.5 in  Needle localization: anatomical landmarks and ultrasound guidance   -ultrasound image captured on disc.  Assessment  Injection assessment: negative aspiration, negative parasthesia and local visualized surrounding nerve  Paresthesia pain: none  Heart rate change: no  Slow fractionated injection: yes  Additional Notes  Patient tolerated well.  See [facility] RN record for vitals.    0.375% bupi w/ epi and additives administered under ultrasound guidance.

## 2020-02-14 NOTE — H&P
Radiology History & Physical      SUBJECTIVE:     Chief Complaint: L UPJ stone    History of Present Illness:  Kaylin Freire is a 69 y.o. female who presents for L nephrostomy tube placement.    Past Medical History:   Diagnosis Date    Allergy     COPD (chronic obstructive pulmonary disease)     General anesthetics causing adverse effect in therapeutic use     Sometimes hard to wake up from anesthesia-following left Rotator cuff sx    GERD (gastroesophageal reflux disease)     High cholesterol     HTN (hypertension)     Kidney stones 2/14/2020    Renal mass      Past Surgical History:   Procedure Laterality Date    APPENDECTOMY      BREAST BIOPSY      HYSTERECTOMY      ROTATOR CUFF REPAIR Bilateral        Home Meds:   Prior to Admission medications    Medication Sig Start Date End Date Taking? Authorizing Provider   aspirin (ECOTRIN) 81 MG EC tablet Take 81 mg by mouth once daily.   Yes Historical Provider, MD   atorvastatin (LIPITOR) 10 MG tablet every evening.  10/31/19  Yes Historical Provider, MD   cetirizine (ZYRTEC) 10 MG tablet Take 10 mg by mouth nightly. 10/28/19  Yes Historical Provider, MD   lisinopril (PRINIVIL,ZESTRIL) 5 MG tablet once daily.  10/30/19  Yes Historical Provider, MD   pantoprazole (PROTONIX) 40 MG tablet  12/8/19  Yes Historical Provider, MD     Anticoagulants/Antiplatelets: aspirin    Allergies:   Review of patient's allergies indicates:   Allergen Reactions    Penicillins Swelling    Adhesive Rash     Sedation History:  no adverse reactions    Review of Systems:   Hematological: no known coagulopathies  Respiratory: no shortness of breath  Cardiovascular: no chest pain  Gastrointestinal: no abdominal pain  Genito-Urinary: no dysuria  Musculoskeletal: negative  Neurological: no TIA or stroke symptoms         OBJECTIVE:     Vital Signs (Most Recent)  Temp: 97.9 °F (36.6 °C) (02/14/20 1020)  Pulse: 74 (02/14/20 1020)  Resp: 18 (02/14/20 1020)  BP: (!) 164/83 (02/14/20  1020)  SpO2: 98 % (02/14/20 1020)    Physical Exam:  ASA: per anesthesia  Mallampati: per anesthesia    General: no acute distress  Mental Status: alert and oriented to person, place and time  HEENT: normocephalic, atraumatic  Chest: unlabored breathing  Heart: regular heart rate  Abdomen: nondistended  Extremity: moves all extremities    Laboratory  No results found for: INR    Lab Results   Component Value Date    WBC 6.86 02/12/2020    HGB 13.7 02/12/2020    HCT 43.3 02/12/2020    MCV 90 02/12/2020     02/12/2020      Lab Results   Component Value Date     12/31/2019     12/31/2019    K 4.0 12/31/2019     12/31/2019    CO2 29 12/31/2019    BUN 13 12/31/2019    CREATININE 0.8 12/31/2019    CALCIUM 9.3 12/31/2019       ASSESSMENT/PLAN:     Sedation Plan: per anesthesia  Patient will undergo PCNL of the L kidney.    Manpreet Brewer M.D.  PGY-II Radiology  Pager: [phone]

## 2020-02-14 NOTE — ANESTHESIA PREPROCEDURE EVALUATION
02/14/2020  Kaylin Freire is a 69 y.o., female presenting for PCNL.    Past Medical History:   Diagnosis Date    Allergy     COPD (chronic obstructive pulmonary disease)     General anesthetics causing adverse effect in therapeutic use     Sometimes hard to wake up from anesthesia-following left Rotator cuff sx    GERD (gastroesophageal reflux disease)     High cholesterol     HTN (hypertension)     Kidney stones 2/14/2020    Renal mass      Past Surgical History:   Procedure Laterality Date    APPENDECTOMY      BREAST BIOPSY      HYSTERECTOMY      ROTATOR CUFF REPAIR Bilateral      Review of patient's allergies indicates:   Allergen Reactions    Penicillins Swelling    Adhesive Rash     No current facility-administered medications on file prior to encounter.      Current Outpatient Medications on File Prior to Encounter   Medication Sig Dispense Refill    aspirin (ECOTRIN) 81 MG EC tablet Take 81 mg by mouth once daily.      atorvastatin (LIPITOR) 10 MG tablet every evening.       cetirizine (ZYRTEC) 10 MG tablet Take 10 mg by mouth nightly.      lisinopril (PRINIVIL,ZESTRIL) 5 MG tablet once daily.       pantoprazole (PROTONIX) 40 MG tablet        Lab Results   Component Value Date    WBC 6.86 02/12/2020    HGB 13.7 02/12/2020    HCT 43.3 02/12/2020    MCV 90 02/12/2020     02/12/2020       BMP  Lab Results   Component Value Date     02/14/2020    K 3.9 02/14/2020     02/14/2020    CO2 27 02/14/2020    BUN 11 02/14/2020    CREATININE 0.8 02/14/2020    CALCIUM 9.8 02/14/2020    ANIONGAP 9 02/14/2020    ESTGFRAFRICA >60.0 02/14/2020    EGFRNONAA >60.0 02/14/2020         Anesthesia Evaluation    I have reviewed the Patient Summary Reports.     I have reviewed the Medications.     Review of Systems  Anesthesia Hx:  No problems with previous Anesthesia   Denies Personal Hx of  Anesthesia complications.   Cardiovascular:   Exercise tolerance: good Hypertension    Pulmonary:   COPD    Renal/:   Chronic Renal Disease    Hepatic/GI:   GERD, well controlled    Neurological:   Denies CVA. Denies Seizures.        Physical Exam  General:  Well nourished, Obesity    Airway/Jaw/Neck:  Airway Findings: Mouth Opening: Normal Tongue: Normal  General Airway Assessment: Adult  Mallampati: II  Improves to I with phonation.  TM Distance: Normal, at least 6 cm  Jaw/Neck Findings:  Neck ROM: Normal ROM      Dental:  Dental Findings: Upper Dentures         Mental Status:  Mental Status Findings:  Cooperative, Alert and Oriented         Anesthesia Plan  Type of Anesthesia, risks & benefits discussed:  Anesthesia Type:  general  Patient's Preference:   Intra-op Monitoring Plan: standard ASA monitors  Intra-op Monitoring Plan Comments:   Post Op Pain Control Plan: per primary service following discharge from PACU, IV/PO Opioids PRN, multimodal analgesia and peripheral nerve block  Post Op Pain Control Plan Comments:   Induction:   IV  Beta Blocker:  Patient is not currently on a Beta-Blocker (No further documentation required).       Informed Consent: Patient understands risks and agrees with Anesthesia plan.  Questions answered. Anesthesia consent signed with patient.  ASA Score: 3     Day of Surgery Review of History & Physical:    H&P update referred to the surgeon.         Ready For Surgery From Anesthesia Perspective.

## 2020-02-14 NOTE — INTERVAL H&P NOTE
The patient has been examined and the H&P has been reviewed:    I concur with the findings and no changes have occurred since H&P was written.    Anesthesia/Surgery risks, benefits and alternative options discussed and understood by patient/family.          Active Hospital Problems    Diagnosis  POA    Kidney stones [N20.0]  Yes    Left ureteral stone [N20.1]  Yes      Resolved Hospital Problems   No resolved problems to display.

## 2020-02-14 NOTE — TRANSFER OF CARE
Anesthesia Transfer of Care Note    Patient: Kaylin Freire    Procedure(s) Performed: Procedure(s) (LRB):  NEPHROLITHOTOMY, PERCUTANEOUS (Left)    Patient location: PACU    Anesthesia Type: general    Transport from OR: Transported from OR on 6-10 L/min O2 by face mask with adequate spontaneous ventilation    Post pain: adequate analgesia    Post assessment: no apparent anesthetic complications    Post vital signs: stable    Level of consciousness: awake    Nausea/Vomiting: no nausea/vomiting    Complications: none    Transfer of care protocol was followed      Last vitals:   Visit Vitals  BP (!) 144/78   Pulse 78   Temp 36.6 °C (97.9 °F) (Oral)   Resp (!) 21   Ht 5' (1.524 m)   Wt 80.7 kg (178 lb)   SpO2 100%   Breastfeeding? No   BMI 34.76 kg/m²

## 2020-02-15 LAB
ANION GAP SERPL CALC-SCNC: 5 MMOL/L (ref 8–16)
BASOPHILS # BLD AUTO: 0.01 K/UL (ref 0–0.2)
BASOPHILS # BLD AUTO: 0.01 K/UL (ref 0–0.2)
BASOPHILS # BLD AUTO: 0.02 K/UL (ref 0–0.2)
BASOPHILS NFR BLD: 0.1 % (ref 0–1.9)
BASOPHILS NFR BLD: 0.1 % (ref 0–1.9)
BASOPHILS NFR BLD: 0.3 % (ref 0–1.9)
BUN SERPL-MCNC: 15 MG/DL (ref 8–23)
CALCIUM SERPL-MCNC: 8.1 MG/DL (ref 8.7–10.5)
CHLORIDE SERPL-SCNC: 108 MMOL/L (ref 95–110)
CO2 SERPL-SCNC: 27 MMOL/L (ref 23–29)
CREAT SERPL-MCNC: 1 MG/DL (ref 0.5–1.4)
DIFFERENTIAL METHOD: ABNORMAL
EOSINOPHIL # BLD AUTO: 0 K/UL (ref 0–0.5)
EOSINOPHIL NFR BLD: 0 % (ref 0–8)
ERYTHROCYTE [DISTWIDTH] IN BLOOD BY AUTOMATED COUNT: 12.6 % (ref 11.5–14.5)
ERYTHROCYTE [DISTWIDTH] IN BLOOD BY AUTOMATED COUNT: 12.6 % (ref 11.5–14.5)
ERYTHROCYTE [DISTWIDTH] IN BLOOD BY AUTOMATED COUNT: 12.7 % (ref 11.5–14.5)
EST. GFR  (AFRICAN AMERICAN): >60 ML/MIN/1.73 M^2
EST. GFR  (NON AFRICAN AMERICAN): 57.2 ML/MIN/1.73 M^2
GLUCOSE SERPL-MCNC: 103 MG/DL (ref 70–110)
HCT VFR BLD AUTO: 32.1 % (ref 37–48.5)
HCT VFR BLD AUTO: 32.4 % (ref 37–48.5)
HCT VFR BLD AUTO: 35.4 % (ref 37–48.5)
HGB BLD-MCNC: 10.7 G/DL (ref 12–16)
HGB BLD-MCNC: 9.7 G/DL (ref 12–16)
HGB BLD-MCNC: 9.8 G/DL (ref 12–16)
IMM GRANULOCYTES # BLD AUTO: 0.01 K/UL (ref 0–0.04)
IMM GRANULOCYTES # BLD AUTO: 0.02 K/UL (ref 0–0.04)
IMM GRANULOCYTES # BLD AUTO: 0.03 K/UL (ref 0–0.04)
IMM GRANULOCYTES NFR BLD AUTO: 0.1 % (ref 0–0.5)
IMM GRANULOCYTES NFR BLD AUTO: 0.3 % (ref 0–0.5)
IMM GRANULOCYTES NFR BLD AUTO: 0.4 % (ref 0–0.5)
LYMPHOCYTES # BLD AUTO: 0.6 K/UL (ref 1–4.8)
LYMPHOCYTES # BLD AUTO: 0.8 K/UL (ref 1–4.8)
LYMPHOCYTES # BLD AUTO: 1 K/UL (ref 1–4.8)
LYMPHOCYTES NFR BLD: 10.6 % (ref 18–48)
LYMPHOCYTES NFR BLD: 12.3 % (ref 18–48)
LYMPHOCYTES NFR BLD: 9.9 % (ref 18–48)
MCH RBC QN AUTO: 27.6 PG (ref 27–31)
MCH RBC QN AUTO: 27.7 PG (ref 27–31)
MCH RBC QN AUTO: 27.8 PG (ref 27–31)
MCHC RBC AUTO-ENTMCNC: 30.2 G/DL (ref 32–36)
MCV RBC AUTO: 91 FL (ref 82–98)
MCV RBC AUTO: 92 FL (ref 82–98)
MCV RBC AUTO: 92 FL (ref 82–98)
MONOCYTES # BLD AUTO: 0.5 K/UL (ref 0.3–1)
MONOCYTES # BLD AUTO: 0.7 K/UL (ref 0.3–1)
MONOCYTES # BLD AUTO: 0.8 K/UL (ref 0.3–1)
MONOCYTES NFR BLD: 10.3 % (ref 4–15)
MONOCYTES NFR BLD: 8 % (ref 4–15)
MONOCYTES NFR BLD: 9.3 % (ref 4–15)
NEUTROPHILS # BLD AUTO: 4.7 K/UL (ref 1.8–7.7)
NEUTROPHILS # BLD AUTO: 5.7 K/UL (ref 1.8–7.7)
NEUTROPHILS # BLD AUTO: 6.1 K/UL (ref 1.8–7.7)
NEUTROPHILS NFR BLD: 76.9 % (ref 38–73)
NEUTROPHILS NFR BLD: 79.9 % (ref 38–73)
NEUTROPHILS NFR BLD: 81.5 % (ref 38–73)
NRBC BLD-RTO: 0 /100 WBC
PLATELET # BLD AUTO: 144 K/UL (ref 150–350)
PLATELET # BLD AUTO: 160 K/UL (ref 150–350)
PLATELET # BLD AUTO: 168 K/UL (ref 150–350)
PMV BLD AUTO: 11.2 FL (ref 9.2–12.9)
PMV BLD AUTO: 11.4 FL (ref 9.2–12.9)
PMV BLD AUTO: 11.7 FL (ref 9.2–12.9)
POTASSIUM SERPL-SCNC: 4.7 MMOL/L (ref 3.5–5.1)
RBC # BLD AUTO: 3.52 M/UL (ref 4–5.4)
RBC # BLD AUTO: 3.54 M/UL (ref 4–5.4)
RBC # BLD AUTO: 3.85 M/UL (ref 4–5.4)
SODIUM SERPL-SCNC: 140 MMOL/L (ref 136–145)
WBC # BLD AUTO: 5.76 K/UL (ref 3.9–12.7)
WBC # BLD AUTO: 7.08 K/UL (ref 3.9–12.7)
WBC # BLD AUTO: 7.94 K/UL (ref 3.9–12.7)

## 2020-02-15 PROCEDURE — 25000003 PHARM REV CODE 250: Performed by: STUDENT IN AN ORGANIZED HEALTH CARE EDUCATION/TRAINING PROGRAM

## 2020-02-15 PROCEDURE — 94799 UNLISTED PULMONARY SVC/PX: CPT

## 2020-02-15 PROCEDURE — 87040 BLOOD CULTURE FOR BACTERIA: CPT

## 2020-02-15 PROCEDURE — 80048 BASIC METABOLIC PNL TOTAL CA: CPT

## 2020-02-15 PROCEDURE — 36415 COLL VENOUS BLD VENIPUNCTURE: CPT

## 2020-02-15 PROCEDURE — 99900035 HC TECH TIME PER 15 MIN (STAT)

## 2020-02-15 PROCEDURE — 85025 COMPLETE CBC W/AUTO DIFF WBC: CPT | Mod: 91

## 2020-02-15 PROCEDURE — 94761 N-INVAS EAR/PLS OXIMETRY MLT: CPT

## 2020-02-15 PROCEDURE — 63600175 PHARM REV CODE 636 W HCPCS: Performed by: STUDENT IN AN ORGANIZED HEALTH CARE EDUCATION/TRAINING PROGRAM

## 2020-02-15 PROCEDURE — 11000001 HC ACUTE MED/SURG PRIVATE ROOM

## 2020-02-15 RX ORDER — ACETAMINOPHEN 500 MG
1000 TABLET ORAL EVERY 6 HOURS PRN
Refills: 0 | COMMUNITY
Start: 2020-02-15

## 2020-02-15 RX ORDER — TRAMADOL HYDROCHLORIDE 50 MG/1
50 TABLET ORAL EVERY 6 HOURS PRN
Qty: 5 TABLET | Refills: 0 | Status: SHIPPED | OUTPATIENT
Start: 2020-02-15

## 2020-02-15 RX ADMIN — KETOROLAC TROMETHAMINE 15 MG: 30 INJECTION, SOLUTION INTRAMUSCULAR at 04:02

## 2020-02-15 RX ADMIN — ATORVASTATIN CALCIUM 10 MG: 10 TABLET, FILM COATED ORAL at 08:02

## 2020-02-15 RX ADMIN — ACETAMINOPHEN 1000 MG: 500 TABLET ORAL at 04:02

## 2020-02-15 RX ADMIN — SODIUM CHLORIDE 1000 ML: 0.9 INJECTION, SOLUTION INTRAVENOUS at 09:02

## 2020-02-15 RX ADMIN — PANTOPRAZOLE SODIUM 40 MG: 40 TABLET, DELAYED RELEASE ORAL at 08:02

## 2020-02-15 RX ADMIN — OXYCODONE HYDROCHLORIDE 5 MG: 5 TABLET ORAL at 07:02

## 2020-02-15 RX ADMIN — KETOROLAC TROMETHAMINE 15 MG: 30 INJECTION, SOLUTION INTRAMUSCULAR at 01:02

## 2020-02-15 RX ADMIN — ACETAMINOPHEN 1000 MG: 500 TABLET ORAL at 08:02

## 2020-02-15 RX ADMIN — ASPIRIN 81 MG: 81 TABLET, COATED ORAL at 08:02

## 2020-02-15 RX ADMIN — DOXYCYCLINE 100 MG: 100 INJECTION, POWDER, LYOPHILIZED, FOR SOLUTION INTRAVENOUS at 09:02

## 2020-02-15 RX ADMIN — PREGABALIN 75 MG: 75 CAPSULE ORAL at 08:02

## 2020-02-15 RX ADMIN — POLYETHYLENE GLYCOL 3350 17 G: 17 POWDER, FOR SOLUTION ORAL at 08:02

## 2020-02-15 NOTE — ASSESSMENT & PLAN NOTE
Kaylin Freire is a 69 y.o. female s/p left PCNL on 2/14/20    - Regular diet  - Aceves irrigated easily to clear this am and removed  - Voiding trial  - SLIV  - Ambulate this am  - Follow up am labs  - Plan for stent removal in 2 weeks    Discharge home this am after patient ambulates in patel and tolerates breakfast.

## 2020-02-15 NOTE — NURSING
Pt arrived to room 501 via stretcher from PACU by hospital staff. Pt AA&O x 4, VSS, and no s/s of distress. PIV x 1 with NS infusing at 75 ml/hr. Aceves draining red urine to gravity. Dressing to Left Back c/d/i. All safety precautions in place. Family at bedside.

## 2020-02-15 NOTE — SUBJECTIVE & OBJECTIVE
Interval History:   No acute events overnight  Pain well controlled  No nausea  Has not ambulated  Tolerating liquids, has not attempted solid food yet    Review of Systems  Objective:     Temp:  [96.5 °F (35.8 °C)-98.3 °F (36.8 °C)] 98.1 °F (36.7 °C)  Pulse:  [55-84] 72  Resp:  [16-24] 18  SpO2:  [93 %-100 %] 96 %  BP: (104-181)/(56-83) 117/56     Body mass index is 34.76 kg/m².           Drains     Drain                 Urethral Catheter 02/14/20 1500 Non-latex;Straight-tip 16 Fr. less than 1 day                Physical Exam   Constitutional: She is oriented to person, place, and time. She appears well-developed and well-nourished. No distress.   HENT:   Head: Normocephalic and atraumatic.   Neck: No JVD present.   Cardiovascular: Normal rate and regular rhythm.    Pulmonary/Chest: Effort normal. No respiratory distress.   Abdominal: Soft. She exhibits no distension. There is no tenderness. There is no guarding.   Genitourinary:   Genitourinary Comments: Aceves draining red with old appearing blood  Left flank dressing c/d/i   Neurological: She is alert and oriented to person, place, and time.   Skin: She is not diaphoretic.     Psychiatric: She has a normal mood and affect. Her behavior is normal.       Significant Labs:    BMP:  Recent Labs   Lab 02/14/20  1033 02/14/20  1713    140   K 3.9 4.3    109   CO2 27 24   BUN 11 12   CREATININE 0.8 0.8   CALCIUM 9.8 8.6*       CBC:   Recent Labs   Lab 02/12/20  1201 02/14/20  1713   WBC 6.86 8.89   HGB 13.7 12.5   HCT 43.3 40.7    180       All pertinent labs results from the past 24 hours have been reviewed.    Significant Imaging:  All pertinent imaging results/findings from the past 24 hours have been reviewed.

## 2020-02-15 NOTE — PROGRESS NOTES
[facility]  Urology  Progress Note    Patient Name: Kaylin Freire  MRN: [id number]  Admission Date: 2/14/2020  Hospital Length of Stay: 1 days  Code Status: Full Code   Attending Provider: Arjun Montejo MD   Primary Care Physician: Poly Johnson MD    Subjective:     HPI:  Kaylin Freire is a 69 y.o. female s/p left PCNL on 2/14/20    Interval History:   No acute events overnight  Pain well controlled  No nausea  Has not ambulated  Tolerating liquids, has not attempted solid food yet    Review of Systems  Objective:     Temp:  [96.5 °F (35.8 °C)-98.3 °F (36.8 °C)] 98.1 °F (36.7 °C)  Pulse:  [55-84] 72  Resp:  [16-24] 18  SpO2:  [93 %-100 %] 96 %  BP: (104-181)/(56-83) 117/56     Body mass index is 34.76 kg/m².           Drains     Drain                 Urethral Catheter 02/14/20 1500 Non-latex;Straight-tip 16 Fr. less than 1 day                Physical Exam   Constitutional: She is oriented to person, place, and time. She appears well-developed and well-nourished. No distress.   HENT:   Head: Normocephalic and atraumatic.   Neck: No JVD present.   Cardiovascular: Normal rate and regular rhythm.    Pulmonary/Chest: Effort normal. No respiratory distress.   Abdominal: Soft. She exhibits no distension. There is no tenderness. There is no guarding.   Genitourinary:   Genitourinary Comments: Aceves draining red with old appearing blood  Left flank dressing c/d/i   Neurological: She is alert and oriented to person, place, and time.   Skin: She is not diaphoretic.     Psychiatric: She has a normal mood and affect. Her behavior is normal.       Significant Labs:    BMP:  Recent Labs   Lab 02/14/20  1033 02/14/20  1713    140   K 3.9 4.3    109   CO2 27 24   BUN 11 12   CREATININE 0.8 0.8   CALCIUM 9.8 8.6*       CBC:   Recent Labs   Lab 02/12/20  1201 02/14/20  1713   WBC 6.86 8.89   HGB 13.7 12.5   HCT 43.3 40.7    180       All pertinent labs results from the past 24 hours have been  reviewed.    Significant Imaging:  All pertinent imaging results/findings from the past 24 hours have been reviewed.      Assessment/Plan:     * Left ureteral stone  Kaylin Freire is a 69 y.o. female s/p left PCNL on 2/14/20    - Regular diet  - Aceves irrigated easily to clear this am and removed  - Voiding trial  - SLIV  - Ambulate this am  - Follow up am labs  - Plan for stent removal in 2 weeks    Discharge home this am after patient ambulates in patel and tolerates breakfast.         VTE Risk Mitigation (From admission, onward)         Ordered     IP VTE HIGH RISK PATIENT  Once      02/14/20 1645     Place NEFTALI hose  Until discontinued      02/14/20 1645     Place sequential compression device  Until discontinued      02/14/20 1645                Esperanza Lopez MD  Urology  [facility]

## 2020-02-16 VITALS
RESPIRATION RATE: 14 BRPM | WEIGHT: 178 LBS | DIASTOLIC BLOOD PRESSURE: 66 MMHG | HEART RATE: 76 BPM | OXYGEN SATURATION: 97 % | SYSTOLIC BLOOD PRESSURE: 135 MMHG | BODY MASS INDEX: 34.95 KG/M2 | TEMPERATURE: 98 F | HEIGHT: 60 IN

## 2020-02-16 LAB
ANION GAP SERPL CALC-SCNC: 6 MMOL/L (ref 8–16)
BASOPHILS # BLD AUTO: 0.02 K/UL (ref 0–0.2)
BASOPHILS NFR BLD: 0.4 % (ref 0–1.9)
BUN SERPL-MCNC: 12 MG/DL (ref 8–23)
CALCIUM SERPL-MCNC: 8 MG/DL (ref 8.7–10.5)
CHLORIDE SERPL-SCNC: 114 MMOL/L (ref 95–110)
CO2 SERPL-SCNC: 21 MMOL/L (ref 23–29)
CREAT SERPL-MCNC: 0.8 MG/DL (ref 0.5–1.4)
DIFFERENTIAL METHOD: ABNORMAL
EOSINOPHIL # BLD AUTO: 0 K/UL (ref 0–0.5)
EOSINOPHIL NFR BLD: 0 % (ref 0–8)
ERYTHROCYTE [DISTWIDTH] IN BLOOD BY AUTOMATED COUNT: 12.9 % (ref 11.5–14.5)
EST. GFR  (AFRICAN AMERICAN): >60 ML/MIN/1.73 M^2
EST. GFR  (NON AFRICAN AMERICAN): >60 ML/MIN/1.73 M^2
GLUCOSE SERPL-MCNC: 92 MG/DL (ref 70–110)
HCT VFR BLD AUTO: 30.7 % (ref 37–48.5)
HGB BLD-MCNC: 9.5 G/DL (ref 12–16)
IMM GRANULOCYTES # BLD AUTO: 0.02 K/UL (ref 0–0.04)
IMM GRANULOCYTES NFR BLD AUTO: 0.4 % (ref 0–0.5)
LYMPHOCYTES # BLD AUTO: 0.8 K/UL (ref 1–4.8)
LYMPHOCYTES NFR BLD: 16.4 % (ref 18–48)
MCH RBC QN AUTO: 28.4 PG (ref 27–31)
MCHC RBC AUTO-ENTMCNC: 30.9 G/DL (ref 32–36)
MCV RBC AUTO: 92 FL (ref 82–98)
MONOCYTES # BLD AUTO: 0.5 K/UL (ref 0.3–1)
MONOCYTES NFR BLD: 9.8 % (ref 4–15)
NEUTROPHILS # BLD AUTO: 3.3 K/UL (ref 1.8–7.7)
NEUTROPHILS NFR BLD: 73 % (ref 38–73)
NRBC BLD-RTO: 0 /100 WBC
PLATELET # BLD AUTO: 138 K/UL (ref 150–350)
PMV BLD AUTO: 11.6 FL (ref 9.2–12.9)
POTASSIUM SERPL-SCNC: 3.8 MMOL/L (ref 3.5–5.1)
RBC # BLD AUTO: 3.35 M/UL (ref 4–5.4)
SODIUM SERPL-SCNC: 141 MMOL/L (ref 136–145)
WBC # BLD AUTO: 4.57 K/UL (ref 3.9–12.7)

## 2020-02-16 PROCEDURE — 87086 URINE CULTURE/COLONY COUNT: CPT

## 2020-02-16 PROCEDURE — 25000003 PHARM REV CODE 250: Performed by: STUDENT IN AN ORGANIZED HEALTH CARE EDUCATION/TRAINING PROGRAM

## 2020-02-16 PROCEDURE — 36415 COLL VENOUS BLD VENIPUNCTURE: CPT

## 2020-02-16 PROCEDURE — 80048 BASIC METABOLIC PNL TOTAL CA: CPT

## 2020-02-16 PROCEDURE — 85025 COMPLETE CBC W/AUTO DIFF WBC: CPT

## 2020-02-16 PROCEDURE — 63600175 PHARM REV CODE 636 W HCPCS: Performed by: STUDENT IN AN ORGANIZED HEALTH CARE EDUCATION/TRAINING PROGRAM

## 2020-02-16 RX ORDER — SULFAMETHOXAZOLE AND TRIMETHOPRIM 800; 160 MG/1; MG/1
1 TABLET ORAL 2 TIMES DAILY
Qty: 10 TABLET | Refills: 0 | Status: SHIPPED | OUTPATIENT
Start: 2020-02-16 | End: 2020-02-21

## 2020-02-16 RX ADMIN — ACETAMINOPHEN 1000 MG: 500 TABLET ORAL at 12:02

## 2020-02-16 RX ADMIN — PANTOPRAZOLE SODIUM 40 MG: 40 TABLET, DELAYED RELEASE ORAL at 09:02

## 2020-02-16 RX ADMIN — LISINOPRIL 5 MG: 5 TABLET ORAL at 09:02

## 2020-02-16 RX ADMIN — POLYETHYLENE GLYCOL 3350 17 G: 17 POWDER, FOR SOLUTION ORAL at 09:02

## 2020-02-16 RX ADMIN — DOXYCYCLINE 100 MG: 100 INJECTION, POWDER, LYOPHILIZED, FOR SOLUTION INTRAVENOUS at 09:02

## 2020-02-16 RX ADMIN — ACETAMINOPHEN 1000 MG: 500 TABLET ORAL at 05:02

## 2020-02-16 NOTE — CARE UPDATE
Rapid Response Nurse Chart Check     Chart check completed, abnormal VS noted. Febrile 101.6F - BCx and BX ordered. Bedside RN Chantelle contacted, no concerns verbalized at this time, instructed to call [phone] for further concerns or assistance.

## 2020-02-16 NOTE — PLAN OF CARE
Plan of care reviewed and discussed with patient and family. Pt and family verbalizes understanding. No falls this shift. All safety precautions in place. Pt AA&O x 4 with no s/s of distress.

## 2020-02-16 NOTE — ASSESSMENT & PLAN NOTE
Kaylin Freire is a 69 y.o. female s/p left PCNL on 2/14/20    - Regular diet  - Aceves irrigated easily to clear this am, will remove again this am  - Voiding trial  - SLIV  - Ambulate this am, up in chair  - H/H and Cr stable this am  - Plan for stent removal in 2 weeks with home urologist    Potential discharge home this pm if remains afebrile and stable.

## 2020-02-16 NOTE — ANESTHESIA POSTPROCEDURE EVALUATION
Anesthesia Post Evaluation    Patient: Kaylin Freire    Procedure(s) Performed: Procedure(s) (LRB):  NEPHROLITHOTOMY, PERCUTANEOUS (Left)    Final Anesthesia Type: general    Patient location during evaluation: floor  Patient participation: Yes- Able to Participate  Level of consciousness: awake and alert  Post-procedure vital signs: reviewed and stable  Pain management: adequate  Airway patency: patent    PONV status at discharge: No PONV  Anesthetic complications: no      Cardiovascular status: blood pressure returned to baseline  Respiratory status: unassisted, spontaneous ventilation and room air  Hydration status: euvolemic  Follow-up not needed.          Vitals Value Taken Time   /78 2/16/2020  7:50 AM   Temp 35.9 °C (96.6 °F) 2/16/2020  7:50 AM   Pulse 77 2/16/2020  7:50 AM   Resp 16 2/16/2020  7:50 AM   SpO2 94 % 2/16/2020  7:50 AM         Event Time     Out of Recovery 17:15:00          Pain/Nuno Score: Pain Rating Prior to Med Admin: 2 (2/16/2020  5:05 AM)

## 2020-02-16 NOTE — PROGRESS NOTES
[facility]  Urology  Progress Note    Patient Name: Kaylin Freire  MRN: [id number]  Admission Date: 2/14/2020  Hospital Length of Stay: 2 days  Code Status: Full Code   Attending Provider: Arjun Montejo MD   Primary Care Physician: Poly Johnson MD    Subjective:     HPI:  Kaylin Freire is a 69 y.o. female s/p left PCNL on 2/14/20    Interval History:   No acute events overnight  Febrile to 101.6 last night however afebrile since  Hypotensive during the day however responded to fluids  Denies pain  Tolerating regular diet  Has not ambulated  Aceves replaced secondary to hematuria    Review of Systems  Objective:     Temp:  [96.6 °F (35.9 °C)-101.6 °F (38.7 °C)] 96.6 °F (35.9 °C)  Pulse:  [77-95] 77  Resp:  [16-18] 16  SpO2:  [92 %-98 %] 94 %  BP: ()/(50-78) 119/78     Body mass index is 34.76 kg/m².           Drains     Drain                 Urethral Catheter 02/15/20 1 day                Physical Exam   Constitutional: She is oriented to person, place, and time. She appears well-developed and well-nourished. No distress.   HENT:   Head: Normocephalic and atraumatic.   Neck: No JVD present.   Cardiovascular: Normal rate and regular rhythm.    Pulmonary/Chest: Effort normal. No respiratory distress.   Abdominal: Soft. She exhibits no distension. There is no tenderness. There is no guarding.   Genitourinary:   Genitourinary Comments: Aceves draining red with old appearing blood  Left flank dressing c/d/i   Neurological: She is alert and oriented to person, place, and time.   Skin: She is not diaphoretic.     Psychiatric: She has a normal mood and affect. Her behavior is normal.       Significant Labs:    BMP:  Recent Labs   Lab 02/14/20  1713 02/15/20  0601 02/16/20  0654    140 141   K 4.3 4.7 3.8    108 114*   CO2 24 27 21*   BUN 12 15 12   CREATININE 0.8 1.0 0.8   CALCIUM 8.6* 8.1* 8.0*       CBC:   Recent Labs   Lab 02/15/20  1131 02/15/20  1539 02/16/20  0654   WBC 7.08 5.76  4.57   HGB 9.8* 9.7* 9.5*   HCT 32.4* 32.1* 30.7*    144* 138*       Blood Culture:   Recent Labs   Lab 02/15/20  2103 02/15/20  2104   LABBLOO No Growth to date No Growth to date     Urine Culture:   Recent Labs   Lab 02/12/20  1133   LABURIN No growth       Significant Imaging:  All pertinent imaging results/findings from the past 24 hours have been reviewed.      Assessment/Plan:     * Left ureteral stone  Kaylin Freire is a 69 y.o. female s/p left PCNL on 2/14/20    - Regular diet  - Aceves irrigated easily to clear this am, will remove again this am  - Voiding trial  - SLIV  - Ambulate this am, up in chair  - H/H and Cr stable this am  - Plan for stent removal in 2 weeks with home urologist    Potential discharge home this pm if remains afebrile and stable.         VTE Risk Mitigation (From admission, onward)         Ordered     IP VTE HIGH RISK PATIENT  Once      02/14/20 1645     Place NEFTALI hose  Until discontinued      02/14/20 1645     Place sequential compression device  Until discontinued      02/14/20 1645                Esperanza Lopez MD  Urology  [facility]

## 2020-02-16 NOTE — DISCHARGE INSTRUCTIONS
Do not shower until 48 hours after surgery. Do not scrub incision in the shower, but rather, let soapy water run over incisions. Do not submerge incision in water (no baths, hot tubs, or swimming pools). It is expected to have leakage of fluid from your former nephrostomy tube site until the incision heals. Keep the site of your former nephrostomy tube covered with gauze and Tegaderm (plastic dressing).     You may see some blood in your urine while the stone fragments are passing and a few days afterward. Do not be alarmed, even if the urine was clear for a while. Push fluids and refrain from strenuous activity until clearing occurs. If you have difficulty passing clots or don't improve, call us. Avoid medications such as Aspirin, Advil, Motrin, Plavix, or Coumadin, which thin the blood and cause bleeding.    Diet:  You may return to your normal diet immediately. Alcohol, spicy foods, acidy foods and drinks with caffeine may cause irritation or frequency and should be used in moderation. To keep your urine flowing freely and to avoid constipation, drink plenty of fluids during the day (8-10 glasses).  Activity:  While the kidney is healing do not engage in strenuous activity. If you are active, you may see more blood in the urine. We would suggest cutting down your activity under these circumstances until the bleeding has stopped, perhaps two weeks.  Bowels:  It is important to keep your bowels regular during the postoperative period. Straining with bowel movements can cause bleeding. A bowel movement every other day is reasonable. Use a mild over-the-counter laxative if needed, such as Milk of Magnesia 2-3 tablespoons, or 1-2 Dulcolax tablets. Call if you continue to have problems. Narcotics can worsen constipation; if you had been taking narcotics for pain, before, during or after your surgery, you may be constipated. Ditropan for bladder spasms may also cause constipation.  Problems you should report to  us:  1. Fevers over 101.5 degrees Fahrenheit.   2. Inability to urinate.   3. Drug reactions (hives, rash, nausea, vomiting, diarrhea)   4. Severe burning or pain with urination that is not improving.   You will also have some burning with urination. This is normal after stone therapy. This burning should be mild or moderate and should improve over time. Severe burning, especially when it is not improving, can be a sign of infection.  Follow-up  After the stone has been fragmented you will likely need an x-ray and kidney ultrasound prior to next clinic appointment    Call Urology at [phone] with any problems

## 2020-02-16 NOTE — NURSING
Pt A & O x 4, ambulated in halls 4x this AM, murdock removed and voiding dark urine x 700 mL, bladder scan volume at 1342 revealed 14 mL retained, family at bedside, call light within reach, MD to review plan of care this afternoon pending bladder scan review.

## 2020-02-16 NOTE — SUBJECTIVE & OBJECTIVE
Interval History:   No acute events overnight  Febrile to 101.6 last night however afebrile since  Hypotensive during the day however responded to fluids  Denies pain  Tolerating regular diet  Has not ambulated  Aceves replaced secondary to hematuria    Review of Systems  Objective:     Temp:  [96.6 °F (35.9 °C)-101.6 °F (38.7 °C)] 96.6 °F (35.9 °C)  Pulse:  [77-95] 77  Resp:  [16-18] 16  SpO2:  [92 %-98 %] 94 %  BP: ()/(50-78) 119/78     Body mass index is 34.76 kg/m².           Drains     Drain                 Urethral Catheter 02/15/20 1 day                Physical Exam   Constitutional: She is oriented to person, place, and time. She appears well-developed and well-nourished. No distress.   HENT:   Head: Normocephalic and atraumatic.   Neck: No JVD present.   Cardiovascular: Normal rate and regular rhythm.    Pulmonary/Chest: Effort normal. No respiratory distress.   Abdominal: Soft. She exhibits no distension. There is no tenderness. There is no guarding.   Genitourinary:   Genitourinary Comments: Aceves draining red with old appearing blood  Left flank dressing c/d/i   Neurological: She is alert and oriented to person, place, and time.   Skin: She is not diaphoretic.     Psychiatric: She has a normal mood and affect. Her behavior is normal.       Significant Labs:    BMP:  Recent Labs   Lab 02/14/20  1713 02/15/20  0601 02/16/20  0654    140 141   K 4.3 4.7 3.8    108 114*   CO2 24 27 21*   BUN 12 15 12   CREATININE 0.8 1.0 0.8   CALCIUM 8.6* 8.1* 8.0*       CBC:   Recent Labs   Lab 02/15/20  1131 02/15/20  1539 02/16/20  0654   WBC 7.08 5.76 4.57   HGB 9.8* 9.7* 9.5*   HCT 32.4* 32.1* 30.7*    144* 138*       Blood Culture:   Recent Labs   Lab 02/15/20  2103 02/15/20  2104   LABBLOO No Growth to date No Growth to date     Urine Culture:   Recent Labs   Lab 02/12/20  1133   LABURIN No growth       Significant Imaging:  All pertinent imaging results/findings from the past 24 hours have  been reviewed.

## 2020-02-16 NOTE — NURSING
Bedside report received from CALE Valadez. Pt sitting on side of bed AA&O x 4. No s/s of distress. Aceves draining bright red urine to gravity, physicians aware.

## 2020-02-17 LAB — BACTERIA UR CULT: NO GROWTH

## 2020-02-17 NOTE — DISCHARGE SUMMARY
[facility]  Urology  Discharge Summary      Patient Name: Kaylin Freire  MRN: [id number]  Admission Date: 2/14/2020  Hospital Length of Stay: 2 days  Discharge Date and Time: 2/16/2020  3:22 PM  Attending Physician: Arjun Montejo MD  Discharging Provider: Raphael Esparza MD  Primary Care Physician: Poly Johnson MD    HPI:   Kaylni Freire is a 69 y.o. female s/p left PCNL on 2/14/20    Procedure(s) (LRB):  NEPHROLITHOTOMY, PERCUTANEOUS (Left)     Indwelling Lines/Drains at time of discharge:   Lines/Drains/Airways     None                 Hospital Course (synopsis of major diagnoses, care, treatment, and services provided during the course of the hospital stay): Patient underwent left PCNL on 2/14/20. She tolerated the surgery well and was transferred to the floor from PACU. Her murdock was removed the AM of 2/15 but due to concern for continued gross hematuria, it was replaced later that afternoon. Her murdock was easy to clear on irrigation so it was removed the AM of 2/16 and she passed a voiding trial. She was hypotensive post-operatively which was responsive to fluids. She was anemic post-op but her hemoglobin stabilized and she did not require transfusion. She was febrile to 101.6 the evening of 2/15 in which blood and urine cultures were obtained. She subsequently remained afebrile. She was started on empiric doxycycline and was discharged on empiric bactrim. By discharge, her blood cultures had shown no growth to date and her urine culture was still in process. She ambulated, tolerated a regular diet, and her pain was controlled. She was deemed safe for discharge home the afternoon of 2/16.    Consults: See chart review.    Significant Diagnostic Studies:     Pending Diagnostic Studies:     Procedure Component Value Units Date/Time    Urinary Stone Analysis [[id number]] Collected:  02/14/20 1612    Order Status:  Sent Lab Status:  In process Updated:  02/14/20 1613    Specimen:  Kidney, left  from Stone           Final Active Diagnoses:    Diagnosis Date Noted POA    PRINCIPAL PROBLEM:  Left ureteral stone [N20.1] 02/14/2020 Yes    Kidney stones [N20.0] 02/14/2020 Yes      Problems Resolved During this Admission:         Discharged Condition: good    Disposition: Home or Self Care    Follow Up:  Follow-up Information     Arjun Montejo MD In 2 weeks.    Specialty:  Urology  Why:  stent removal  Contact information:  Audie ABREU  [facility] [id number] [phone]                 Patient Instructions:      CT Renal Stone Study ABD Pelvis WO   Standing Status: Future Standing Exp. Date: 08/15/20     Notify your health care provider if you experience any of the following:  temperature >100.4     Notify your health care provider if you experience any of the following:  persistent nausea and vomiting or diarrhea     Notify your health care provider if you experience any of the following:  severe uncontrolled pain     Notify your health care provider if you experience any of the following:  redness, tenderness, or signs of infection (pain, swelling, redness, odor or green/yellow discharge around incision site)     Notify your health care provider if you experience any of the following:   Order Comments: Bloody urine     Remove dressing in 24 hours     TYPE AND SCREEN PREOP   Standing Status: Future Standing Exp. Date: 04/04/21     CYSTOSCOPY W/ STENT REMOVAL   Standing Status: Future Standing Exp. Date: 08/15/20     Activity as tolerated     Shower on day dressing removed (No bath)     Medications:  Reconciled Home Medications:      Medication List      START taking these medications    acetaminophen 500 MG tablet  Commonly known as:  TYLENOL  Take 2 tablets (1,000 mg total) by mouth every 6 (six) hours as needed for Pain.     sulfamethoxazole-trimethoprim 800-160mg 800-160 mg Tab  Commonly known as:  BACTRIM DS  Take 1 tablet by mouth 2 (two) times daily. for 5 days     traMADol 50 mg  tablet  Commonly known as:  ULTRAM  Take 1 tablet (50 mg total) by mouth every 6 (six) hours as needed for Pain.        CONTINUE taking these medications    aspirin 81 MG EC tablet  Commonly known as:  ECOTRIN  Take 81 mg by mouth once daily.     atorvastatin 10 MG tablet  Commonly known as:  LIPITOR  every evening.     cetirizine 10 MG tablet  Commonly known as:  ZYRTEC  Take 10 mg by mouth nightly.     lisinopril 5 MG tablet  Commonly known as:  PRINIVIL,ZESTRIL  once daily.     pantoprazole 40 MG tablet  Commonly known as:  PROTONIX            Time spent on the discharge of patient: 20 minutes    Raphael Esparza MD  Urology  [facility]

## 2020-02-18 ENCOUNTER — PATIENT OUTREACH (OUTPATIENT)
Dept: ADMINISTRATIVE | Facility: CLINIC | Age: 70
End: 2020-02-18

## 2020-02-18 NOTE — PATIENT INSTRUCTIONS
Treating Kidney Stones: Percutaneous Nephrolithotomy    Percutaneous nephrolithotomy may be done before, after, or instead of other treatments. If you need this procedure, your healthcare provider will discuss its risks and possible complications. You will be told how to prepare. And you will be told about medicine (anesthesia) that will keep you pain-free during treatment.     An instrument inserted through a viewing tube cracks the stone.    Nephrolithotomy with incision  Percutaneous nephrolithotomy removes larger stones through a small incision in your side. Your doctor places a viewing tube through your incision. The stone is sighted, shattered with a special device if needed, and removed. Afterward, youll briefly have a small soft tube in your incision. This tube carries urine away from your kidney and out of your body.     Pieces of stone are plucked or sucked out through the incision.   Your recovery  You may spend 1 day or 3 days in the hospital. The tube in your side will be removed during or shortly after your hospital stay. A follow-up visit in 3 months will ensure that your stone is gone. Later visits will help your healthcare provider spot new stones if any form.  When to call your healthcare provider  Contact your healthcare provider right away if you have:  · Sudden pain or flank pain  · A fever over 100.4°F (38°C)  · Nausea that lasts for days  · Heavy bleeding when you urinate or through your drainage tube  · Swelling or redness around your incision     © 5362-3506 [employer]. [address] [place] [address]. All rights reserved. This information is not intended as a substitute for professional medical care. Always follow your healthcare professional's instructions.

## 2020-02-21 LAB
BACTERIA BLD CULT: NORMAL
BACTERIA BLD CULT: NORMAL

## 2020-02-21 NOTE — ADDENDUM NOTE
Addendum  created 02/21/20 1617 by Mikael Davenport MD    Attestation recorded in Intraprocedure, Intraprocedure Attestations filed

## 2020-02-24 ENCOUNTER — HOSPITAL ENCOUNTER (INPATIENT)
Facility: HOSPITAL | Age: 70
LOS: 2 days | Discharge: HOME | DRG: 696 | End: 2020-02-26
Attending: UROLOGY | Admitting: UROLOGY
Payer: MEDICARE

## 2020-02-24 DIAGNOSIS — N20.1 LEFT URETERAL STONE: ICD-10-CM

## 2020-02-24 DIAGNOSIS — R31.0 GROSS HEMATURIA: ICD-10-CM

## 2020-02-24 DIAGNOSIS — N20.0 KIDNEY STONES: Primary | ICD-10-CM

## 2020-02-24 DIAGNOSIS — D62 ACUTE BLOOD LOSS ANEMIA: ICD-10-CM

## 2020-02-24 LAB
ABO + RH BLD: NORMAL
BLD GP AB SCN CELLS X3 SERPL QL: NORMAL

## 2020-02-24 PROCEDURE — 99024 POSTOP FOLLOW-UP VISIT: CPT | Mod: GC,,, | Performed by: UROLOGY

## 2020-02-24 PROCEDURE — 25500020 PHARM REV CODE 255: Performed by: UROLOGY

## 2020-02-24 PROCEDURE — 99024 PR POST-OP FOLLOW-UP VISIT: ICD-10-PCS | Mod: GC,,, | Performed by: UROLOGY

## 2020-02-24 PROCEDURE — 25000003 PHARM REV CODE 250: Performed by: STUDENT IN AN ORGANIZED HEALTH CARE EDUCATION/TRAINING PROGRAM

## 2020-02-24 PROCEDURE — 86901 BLOOD TYPING SEROLOGIC RH(D): CPT

## 2020-02-24 PROCEDURE — 11000001 HC ACUTE MED/SURG PRIVATE ROOM

## 2020-02-24 PROCEDURE — 63600175 PHARM REV CODE 636 W HCPCS: Performed by: STUDENT IN AN ORGANIZED HEALTH CARE EDUCATION/TRAINING PROGRAM

## 2020-02-24 PROCEDURE — 36415 COLL VENOUS BLD VENIPUNCTURE: CPT

## 2020-02-24 PROCEDURE — 63600175 PHARM REV CODE 636 W HCPCS: Performed by: RADIOLOGY

## 2020-02-24 RX ORDER — CETIRIZINE HYDROCHLORIDE 5 MG/1
10 TABLET ORAL NIGHTLY
Status: DISCONTINUED | OUTPATIENT
Start: 2020-02-24 | End: 2020-02-26 | Stop reason: HOSPADM

## 2020-02-24 RX ORDER — PANTOPRAZOLE SODIUM 40 MG/1
40 TABLET, DELAYED RELEASE ORAL DAILY
Status: DISCONTINUED | OUTPATIENT
Start: 2020-02-24 | End: 2020-02-26 | Stop reason: HOSPADM

## 2020-02-24 RX ORDER — OXYCODONE HYDROCHLORIDE 5 MG/1
5 TABLET ORAL EVERY 4 HOURS PRN
Status: DISCONTINUED | OUTPATIENT
Start: 2020-02-24 | End: 2020-02-26 | Stop reason: HOSPADM

## 2020-02-24 RX ORDER — LISINOPRIL 5 MG/1
5 TABLET ORAL DAILY
Status: DISCONTINUED | OUTPATIENT
Start: 2020-02-24 | End: 2020-02-26 | Stop reason: HOSPADM

## 2020-02-24 RX ORDER — SODIUM CHLORIDE 9 MG/ML
INJECTION, SOLUTION INTRAVENOUS CONTINUOUS
Status: DISCONTINUED | OUTPATIENT
Start: 2020-02-24 | End: 2020-02-25

## 2020-02-24 RX ORDER — ATORVASTATIN CALCIUM 10 MG/1
10 TABLET, FILM COATED ORAL NIGHTLY
Status: DISCONTINUED | OUTPATIENT
Start: 2020-02-24 | End: 2020-02-26 | Stop reason: HOSPADM

## 2020-02-24 RX ORDER — ACETAMINOPHEN 500 MG
1000 TABLET ORAL EVERY 6 HOURS
Status: DISCONTINUED | OUTPATIENT
Start: 2020-02-24 | End: 2020-02-25

## 2020-02-24 RX ORDER — SODIUM CHLORIDE 0.9 % (FLUSH) 0.9 %
10 SYRINGE (ML) INJECTION
Status: DISCONTINUED | OUTPATIENT
Start: 2020-02-24 | End: 2020-02-26 | Stop reason: HOSPADM

## 2020-02-24 RX ORDER — MIDAZOLAM HYDROCHLORIDE 1 MG/ML
INJECTION INTRAMUSCULAR; INTRAVENOUS CODE/TRAUMA/SEDATION MEDICATION
Status: COMPLETED | OUTPATIENT
Start: 2020-02-24 | End: 2020-02-24

## 2020-02-24 RX ORDER — FENTANYL CITRATE 50 UG/ML
INJECTION, SOLUTION INTRAMUSCULAR; INTRAVENOUS CODE/TRAUMA/SEDATION MEDICATION
Status: COMPLETED | OUTPATIENT
Start: 2020-02-24 | End: 2020-02-24

## 2020-02-24 RX ADMIN — MIDAZOLAM HYDROCHLORIDE 1 MG: 1 INJECTION, SOLUTION INTRAMUSCULAR; INTRAVENOUS at 04:02

## 2020-02-24 RX ADMIN — ATORVASTATIN CALCIUM 10 MG: 10 TABLET, FILM COATED ORAL at 09:02

## 2020-02-24 RX ADMIN — CETIRIZINE HYDROCHLORIDE 10 MG: 5 TABLET ORAL at 09:02

## 2020-02-24 RX ADMIN — ACETAMINOPHEN 1000 MG: 500 TABLET ORAL at 07:02

## 2020-02-24 RX ADMIN — SODIUM CHLORIDE: 0.9 INJECTION, SOLUTION INTRAVENOUS at 01:02

## 2020-02-24 RX ADMIN — IOHEXOL 35 ML: 300 INJECTION, SOLUTION INTRAVENOUS at 05:02

## 2020-02-24 RX ADMIN — FENTANYL CITRATE 25 MCG: 50 INJECTION, SOLUTION INTRAMUSCULAR; INTRAVENOUS at 04:02

## 2020-02-24 NOTE — PLAN OF CARE
Pt arrived to ir 188 for visceral angiogram . Pt oriented to unit and staff. Plan of care reviewed with patient, patient verbalizes understanding. Comfort measures utilized. Pt safely transferred from stretcher to procedural table. Fall risk reviewed with patient, fall risk interventions maintained. Safety strap applied, positioner pillows utilized to minimize pressure points. Blankets applied. Pt prepped and draped utilizing standard sterile technique. Patient placed on continuous monitoring, as required by sedation policy. Timeouts completed utilizing standard universal time-out, per department and facility policy. RN to remain at bedside, continuous monitoring maintained. Pt resting comfortably. Denies pain/discomfort. Will continue to monitor. See flow sheets for monitoring, medication administration, and updates.

## 2020-02-24 NOTE — PROGRESS NOTES
Procedure complete. Patient tolerated well. Right groin site closed with 6 fr angioseal. Dressing clean dry and intact, no bleeding or hematoman noted. Patient to remain on bedrest x2 hours with hob flat. Patient to recover in rocu accompanied per ir nursing staff. Report given at bedside.

## 2020-02-24 NOTE — SUBJECTIVE & OBJECTIVE
Past Medical History:   Diagnosis Date    Acute blood loss anemia 2/24/2020    Allergy     COPD (chronic obstructive pulmonary disease)     General anesthetics causing adverse effect in therapeutic use     Sometimes hard to wake up from anesthesia-following left Rotator cuff sx    GERD (gastroesophageal reflux disease)     High cholesterol     HTN (hypertension)     Kidney stones 2/14/2020    Renal mass        Past Surgical History:   Procedure Laterality Date    APPENDECTOMY      BREAST BIOPSY      HYSTERECTOMY      PERCUTANEOUS NEPHROLITHOTOMY Left 2/14/2020    Procedure: NEPHROLITHOTOMY, PERCUTANEOUS;  Surgeon: Arjun Montejo MD;  Location: [facility] OR [address] [place] [address];  Service: Urology;  Laterality: Left;  2hrs gen with regional     ROTATOR CUFF REPAIR Bilateral        Review of patient's allergies indicates:   Allergen Reactions    Penicillins Swelling    Adhesive Rash       Family History     None          Tobacco Use    Smoking status: Former Smoker     Packs/day: 1.50     Years: 35.00     Pack years: 52.50     Types: Cigarettes    Smokeless tobacco: Never Used    Tobacco comment: quit smoking x7 years ago   Substance and Sexual Activity    Alcohol use: Never     Frequency: Never    Drug use: Never    Sexual activity: Not on file       Review of Systems   Constitutional: Positive for appetite change. Negative for chills and fever.   HENT: Negative.    Eyes: Negative.    Respiratory: Negative for chest tightness and shortness of breath.    Cardiovascular: Negative for chest pain and palpitations.   Gastrointestinal: Positive for abdominal pain and nausea. Negative for vomiting.   Genitourinary: Positive for difficulty urinating, flank pain and hematuria.   Musculoskeletal: Negative for arthralgias and gait problem.   Skin: Negative for color change and rash.   Neurological: Negative for dizziness and headaches.   Psychiatric/Behavioral: Negative for agitation and confusion.       Objective:      Temp:  [97 °F (36.1 °C)-99.1 °F (37.3 °C)] 99.1 °F (37.3 °C)  Pulse:  [83-91] 91  Resp:  [15-22] 15  SpO2:  [93 %-98 %] 93 %  BP: (125-137)/(60-62) 137/60     There is no height or weight on file to calculate BMI.    No intake/output data recorded.       Drains     None                 Physical Exam   Nursing note and vitals reviewed.  Constitutional: She is oriented to person, place, and time. She appears well-developed and well-nourished. No distress.   HENT:   Head: Normocephalic and atraumatic.   Eyes: Pupils are equal, round, and reactive to light. Right eye exhibits no discharge. Left eye exhibits no discharge.   Cardiovascular: Normal rate.    Pulmonary/Chest: Effort normal. No respiratory distress.   Abdominal: Soft. She exhibits no distension. There is tenderness (Suprapubic).   No CVA tenderness   Genitourinary:   Genitourinary Comments: 24 three-way murdock in place, on fast-drip CBI, draining crystal clear   Neurological: She is alert and oriented to person, place, and time.   Skin: Skin is warm. She is diaphoretic.     Psychiatric: She has a normal mood and affect. Her behavior is normal. Judgment and thought content normal.       Significant Labs:    BMP:  No results for input(s): NA, K, CL, CO2, BUN, CREATININE, LABGLOM, GLUCOSE, CALCIUM in the last 168 hours.    CBC:  No results for input(s): WBC, HGB, HCT, PLT in the last 168 hours.    All pertinent labs results from the past 24 hours have been reviewed.    Significant Imaging:  CT Abdomen/Pelvis w/ and w/o Contrast (2/23/20):  - No abnormality of the left adrenal. 2 cm right adrenal nodule.  - Left ureteral stent in place.  - Filling defects throughout the contrast opacified left collecting system consistent with blood clots, largest measuring 3 cm in the upper pole.   - Small amount of left perinephric blood, the largest collection measuring 2.2 cm greatest dimension.  - On arterial phase, there is a 7 mm hyperdensity in the posterior lower pole  which may be a contrast blush from arterial extravasation versus prominent vessel.   - There is a punctate stone or arterial calcification in the central left mid pole. Small cyst of left upper pole.  - Air in bladder with murdock in place

## 2020-02-24 NOTE — ASSESSMENT & PLAN NOTE
69 y.o. female with a history of HTN, HLD, GERD, and COPD who underwent a left PCNL on 2/14 for a large renal pelvis stone who presents as a transfer from [facility] on 2/24 due to concern for left renal bleeding.    - To IR for angiogram  - Strict I's/O's  - Drains: 24 Fr 3-way murdock, on CBI  - NPO  - MIVF  - Home meds, hold ASA 81  - Pain control  - PPx: SCD's/NEFTLAI's  - Dispo: Remain hospitalized

## 2020-02-24 NOTE — PLAN OF CARE
Pt arrived on unit AAOx4, murdock w/ CBI c/d/I, no c/o pain, no falls, sent to IR for procedure awaiting return

## 2020-02-24 NOTE — H&P
[facility]  Urology  History & Physical    Patient Name: Kaylin Freire  MRN: [id number]  Admission Date: 2/24/2020  Code Status: Full Code   Attending Provider: True Torres MD   Primary Care Physician: Poly Johnson MD  Principal Problem:Gross hematuria    Subjective:     HPI:  69 y.o. female  with a history of HTN, HLD, GERD, and COPD who underwent a left PCNL on 2/14 for a large renal pelvis stone who presents as a transfer from [facility] on 2/24 due to concern for left renal bleeding. She was admitted yesterday (2/23) to [facility] with clot retention. She underwent a cystoscopy with clot evacuation yesterday and again today. During cystoscopy today, she had lateralizing hematuria from the left UO. A CT abdomen/pelvis with and without contrast was obtained that was notable for fiilling defects throughout the contrast opacified left collecting system consistent with blood clots. Small amount of left perinephric blood and a 7 mm hyperdensity in the posterior lower pole on arterial phase which may be a contrast blush from arterial extravasation versus prominent vessel. She was started on continuous bladder irrigation and murdock has remained crystal clear on a fast drip today. Her Hgb/Hct yesterday was 11.2/32.3 which downtrended to 10.5/31.9 this morning. Her Cr has remained normal. UA on admission was nitrite positive and showed >100 RBC/hpf, 1-2 WBC/hpf, few bacteria, and 1-2 squams. She was started on cipro 400 mg bid.    Past Medical History:   Diagnosis Date    Acute blood loss anemia 2/24/2020    Allergy     COPD (chronic obstructive pulmonary disease)     General anesthetics causing adverse effect in therapeutic use     Sometimes hard to wake up from anesthesia-following left Rotator cuff sx    GERD (gastroesophageal reflux disease)     High cholesterol     HTN (hypertension)     Kidney stones 2/14/2020    Renal mass        Past Surgical History:   Procedure  Laterality Date    APPENDECTOMY      BREAST BIOPSY      HYSTERECTOMY      PERCUTANEOUS NEPHROLITHOTOMY Left 2/14/2020    Procedure: NEPHROLITHOTOMY, PERCUTANEOUS;  Surgeon: Arjun Montejo MD;  Location: [facility] OR [address] [place] [address];  Service: Urology;  Laterality: Left;  2hrs gen with regional     ROTATOR CUFF REPAIR Bilateral        Review of patient's allergies indicates:   Allergen Reactions    Penicillins Swelling    Adhesive Rash       Family History     None          Tobacco Use    Smoking status: Former Smoker     Packs/day: 1.50     Years: 35.00     Pack years: 52.50     Types: Cigarettes    Smokeless tobacco: Never Used    Tobacco comment: quit smoking x7 years ago   Substance and Sexual Activity    Alcohol use: Never     Frequency: Never    Drug use: Never    Sexual activity: Not on file       Review of Systems   Constitutional: Positive for appetite change. Negative for chills and fever.   HENT: Negative.    Eyes: Negative.    Respiratory: Negative for chest tightness and shortness of breath.    Cardiovascular: Negative for chest pain and palpitations.   Gastrointestinal: Positive for abdominal pain and nausea. Negative for vomiting.   Genitourinary: Positive for difficulty urinating, flank pain and hematuria.   Musculoskeletal: Negative for arthralgias and gait problem.   Skin: Negative for color change and rash.   Neurological: Negative for dizziness and headaches.   Psychiatric/Behavioral: Negative for agitation and confusion.       Objective:     Temp:  [97 °F (36.1 °C)-99.1 °F (37.3 °C)] 99.1 °F (37.3 °C)  Pulse:  [83-91] 91  Resp:  [15-22] 15  SpO2:  [93 %-98 %] 93 %  BP: (125-137)/(60-62) 137/60     There is no height or weight on file to calculate BMI.    No intake/output data recorded.       Drains     None                 Physical Exam   Nursing note and vitals reviewed.  Constitutional: She is oriented to person, place, and time. She appears well-developed and well-nourished. No distress.    HENT:   Head: Normocephalic and atraumatic.   Eyes: Pupils are equal, round, and reactive to light. Right eye exhibits no discharge. Left eye exhibits no discharge.   Cardiovascular: Normal rate.    Pulmonary/Chest: Effort normal. No respiratory distress.   Abdominal: Soft. She exhibits no distension. There is tenderness (Suprapubic).   No CVA tenderness   Genitourinary:   Genitourinary Comments: 24 three-way murdock in place, on fast-drip CBI, draining crystal clear   Neurological: She is alert and oriented to person, place, and time.   Skin: Skin is warm. She is diaphoretic.     Psychiatric: She has a normal mood and affect. Her behavior is normal. Judgment and thought content normal.       Significant Labs:    BMP:  No results for input(s): NA, K, CL, CO2, BUN, CREATININE, LABGLOM, GLUCOSE, CALCIUM in the last 168 hours.    CBC:  No results for input(s): WBC, HGB, HCT, PLT in the last 168 hours.    All pertinent labs results from the past 24 hours have been reviewed.    Significant Imaging:  CT Abdomen/Pelvis w/ and w/o Contrast (2/23/20):  - No abnormality of the left adrenal. 2 cm right adrenal nodule.  - Left ureteral stent in place.  - Filling defects throughout the contrast opacified left collecting system consistent with blood clots, largest measuring 3 cm in the upper pole.   - Small amount of left perinephric blood, the largest collection measuring 2.2 cm greatest dimension.  - On arterial phase, there is a 7 mm hyperdensity in the posterior lower pole which may be a contrast blush from arterial extravasation versus prominent vessel.   - There is a punctate stone or arterial calcification in the central left mid pole. Small cyst of left upper pole.  - Air in bladder with murdock in place      Assessment and Plan:     * Gross hematuria  69 y.o. female with a history of HTN, HLD, GERD, and COPD who underwent a left PCNL on 2/14 for a large renal pelvis stone who presents as a transfer from [facility]  on 2/24 due to concern for left renal bleeding.    - To IR for angiogram  - Strict I's/O's  - Drains: 24 Fr 3-way murdock, on CBI  - NPO  - MIVF  - Home meds, hold ASA 81  - Pain control  - PPx: SCD's/NEFTALI's  - Dispo: Remain hospitalized        VTE Risk Mitigation (From admission, onward)         Ordered     Place NEFTALI hose  Until discontinued      02/24/20 1254     Place sequential compression device  Until discontinued      02/24/20 1254     IP VTE HIGH RISK PATIENT  Once      02/24/20 1254                Raphael Esparza MD  Urology  [facility]   IV discontinued, cath removed intact

## 2020-02-24 NOTE — NURSING
Pt arrived on unit, oriented to room no complaints of pain. CBI running half open, murdock intact draining clear red urine

## 2020-02-24 NOTE — H&P
Inpatient Radiology Pre-procedure Note    History of Present Illness:  Kaylin Freire is a 69 y.o. female who presents for renal angiogram s/p PCNL on 2/14.  Patient had hematuria at home.  Admission H&P reviewed.  Past Medical History:   Diagnosis Date    Acute blood loss anemia 2/24/2020    Allergy     COPD (chronic obstructive pulmonary disease)     General anesthetics causing adverse effect in therapeutic use     Sometimes hard to wake up from anesthesia-following left Rotator cuff sx    GERD (gastroesophageal reflux disease)     High cholesterol     HTN (hypertension)     Kidney stones 2/14/2020    Renal mass      Past Surgical History:   Procedure Laterality Date    APPENDECTOMY      BREAST BIOPSY      HYSTERECTOMY      PERCUTANEOUS NEPHROLITHOTOMY Left 2/14/2020    Procedure: NEPHROLITHOTOMY, PERCUTANEOUS;  Surgeon: Arjun Montejo MD;  Location: [facility] OR [address] [place] [address];  Service: Urology;  Laterality: Left;  2hrs gen with regional     ROTATOR CUFF REPAIR Bilateral        Review of Systems:   As documented in primary team H&P    Home Meds:   Prior to Admission medications    Medication Sig Start Date End Date Taking? Authorizing Provider   acetaminophen (TYLENOL) 500 MG tablet Take 2 tablets (1,000 mg total) by mouth every 6 (six) hours as needed for Pain. 2/15/20   Esperanza Lopez MD   aspirin (ECOTRIN) 81 MG EC tablet Take 81 mg by mouth once daily.    Historical Provider, MD   atorvastatin (LIPITOR) 10 MG tablet every evening.  10/31/19   Historical Provider, MD   cetirizine (ZYRTEC) 10 MG tablet Take 10 mg by mouth nightly. 10/28/19   Historical Provider, MD   lisinopril (PRINIVIL,ZESTRIL) 5 MG tablet once daily.  10/30/19   Historical Provider, MD   pantoprazole (PROTONIX) 40 MG tablet  12/8/19   Historical Provider, MD   traMADol (ULTRAM) 50 mg tablet Take 1 tablet (50 mg total) by mouth every 6 (six) hours as needed for Pain. 2/15/20   Esperanza Lopez MD     Scheduled Meds:     acetaminophen  1,000 mg Oral Q6H    atorvastatin  10 mg Oral QHS    cetirizine  10 mg Oral Nightly    lisinopril  5 mg Oral Daily    pantoprazole  40 mg Oral Daily     Continuous Infusions:    sodium chloride 0.9% 125 mL/hr at 02/24/20 1336     PRN Meds:oxyCODONE, sodium chloride 0.9%  Anticoagulants/Antiplatelets: no anticoagulation    Allergies:   Review of patient's allergies indicates:   Allergen Reactions    Penicillins Swelling    Adhesive Rash     Sedation Hx: have not been any systemic reactions    Labs:  No results for input(s): INR in the last 168 hours.    Invalid input(s):  PT,  PTT  No results for input(s): WBC, HGB, HCT, MCV, PLT in the last 168 hours. No results for input(s): GLU, NA, K, CL, CO2, BUN, CREATININE, CALCIUM, MG, ALT, AST, ALBUMIN, BILITOT, BILIDIR in the last 168 hours.      Vitals:  Temp: 99.1 °F (37.3 °C) (02/24/20 1300)  Pulse: 91 (02/24/20 1300)  Resp: 15 (02/24/20 1300)  BP: 137/60 (02/24/20 1300)  SpO2: (!) 93 % (02/24/20 1300)     Physical Exam:  ASA: 2  Mallampati: 2    General: no acute distress  Mental Status: alert and oriented to person, place and time  HEENT: normocephalic, atraumatic  Chest: unlabored breathing  Heart: regular heart rate  Abdomen: nondistended  Extremity: moves all extremities    Plan: renal angiogram with possible intervention  Sedation Plan: moderate    Manpreet Brewer MD  PGY-II Radiology

## 2020-02-24 NOTE — PROCEDURES
Radiology Post Procedure Note:     Procedure: Hematuria    (s): Mikki    Blood Loss: Minimal    Specimen: None    Findings:   Patient came to IR and under imaging guidance had a left renal angiogram performed demonstrating no evidence of active contrast extravasation or pseudoaneurysm.     Right groin closed with angioseal closure device.     Arun CONTI M.D. personally reviewed and agree with the above dictated note.

## 2020-02-24 NOTE — HPI
69 y.o. female with a history of HTN, HLD, GERD, and COPD who underwent a left PCNL on 2/14 for a large renal pelvis stone who presents as a transfer from [facility] on 2/24 due to concern for left renal bleeding. She was admitted yesterday (2/23) to [facility] with clot retention. She underwent a cystoscopy with clot evacuation yesterday and again today. During cystoscopy today, she had lateralizing hematuria from the left UO. A CT abdomen/pelvis with and without contrast was obtained that was notable for fiilling defects throughout the contrast opacified left collecting system consistent with blood clots. Small amount of left perinephric blood and a 7 mm hyperdensity in the posterior lower pole on arterial phase which may be a contrast blush from arterial extravasation versus prominent vessel. She was started on continuous bladder irrigation and murdock has remained crystal clear on a fast drip today. Her Hgb/Hct yesterday was 11.2/32.3 which downtrended to 10.5/31.9 this morning. Her Cr has remained normal. UA on admission was nitrite positive and showed >100 RBC/hpf, 1-2 WBC/hpf, few bacteria, and 1-2 squams. She was started on cipro 400 mg bid.

## 2020-02-25 LAB
ANION GAP SERPL CALC-SCNC: 9 MMOL/L (ref 8–16)
BASOPHILS # BLD AUTO: 0.03 K/UL (ref 0–0.2)
BASOPHILS NFR BLD: 0.4 % (ref 0–1.9)
BUN SERPL-MCNC: 15 MG/DL (ref 8–23)
CALCIUM SERPL-MCNC: 8.3 MG/DL (ref 8.7–10.5)
CHLORIDE SERPL-SCNC: 109 MMOL/L (ref 95–110)
CO2 SERPL-SCNC: 24 MMOL/L (ref 23–29)
CREAT SERPL-MCNC: 0.8 MG/DL (ref 0.5–1.4)
DIFFERENTIAL METHOD: ABNORMAL
EOSINOPHIL # BLD AUTO: 0 K/UL (ref 0–0.5)
EOSINOPHIL NFR BLD: 0 % (ref 0–8)
ERYTHROCYTE [DISTWIDTH] IN BLOOD BY AUTOMATED COUNT: 13.1 % (ref 11.5–14.5)
EST. GFR  (AFRICAN AMERICAN): >60 ML/MIN/1.73 M^2
EST. GFR  (NON AFRICAN AMERICAN): >60 ML/MIN/1.73 M^2
GLUCOSE SERPL-MCNC: 93 MG/DL (ref 70–110)
HCT VFR BLD AUTO: 28.7 % (ref 37–48.5)
HGB BLD-MCNC: 8.5 G/DL (ref 12–16)
IMM GRANULOCYTES # BLD AUTO: 0.04 K/UL (ref 0–0.04)
IMM GRANULOCYTES NFR BLD AUTO: 0.5 % (ref 0–0.5)
LYMPHOCYTES # BLD AUTO: 1.8 K/UL (ref 1–4.8)
LYMPHOCYTES NFR BLD: 23 % (ref 18–48)
MCH RBC QN AUTO: 27.6 PG (ref 27–31)
MCHC RBC AUTO-ENTMCNC: 29.6 G/DL (ref 32–36)
MCV RBC AUTO: 93 FL (ref 82–98)
MONOCYTES # BLD AUTO: 0.6 K/UL (ref 0.3–1)
MONOCYTES NFR BLD: 7.7 % (ref 4–15)
NEUTROPHILS # BLD AUTO: 5.2 K/UL (ref 1.8–7.7)
NEUTROPHILS NFR BLD: 68.4 % (ref 38–73)
NRBC BLD-RTO: 0 /100 WBC
PLATELET # BLD AUTO: 262 K/UL (ref 150–350)
PMV BLD AUTO: 10.8 FL (ref 9.2–12.9)
POTASSIUM SERPL-SCNC: 4.1 MMOL/L (ref 3.5–5.1)
RBC # BLD AUTO: 3.08 M/UL (ref 4–5.4)
SODIUM SERPL-SCNC: 142 MMOL/L (ref 136–145)
WBC # BLD AUTO: 7.65 K/UL (ref 3.9–12.7)

## 2020-02-25 PROCEDURE — 25000003 PHARM REV CODE 250: Performed by: STUDENT IN AN ORGANIZED HEALTH CARE EDUCATION/TRAINING PROGRAM

## 2020-02-25 PROCEDURE — 85025 COMPLETE CBC W/AUTO DIFF WBC: CPT

## 2020-02-25 PROCEDURE — 11000001 HC ACUTE MED/SURG PRIVATE ROOM

## 2020-02-25 PROCEDURE — 80048 BASIC METABOLIC PNL TOTAL CA: CPT

## 2020-02-25 PROCEDURE — 36415 COLL VENOUS BLD VENIPUNCTURE: CPT

## 2020-02-25 RX ORDER — HYOSCYAMINE SULFATE 0.12 MG/1
0.12 TABLET SUBLINGUAL EVERY 4 HOURS PRN
Status: DISCONTINUED | OUTPATIENT
Start: 2020-02-25 | End: 2020-02-26 | Stop reason: HOSPADM

## 2020-02-25 RX ORDER — ACETAMINOPHEN 500 MG
1000 TABLET ORAL EVERY 6 HOURS PRN
Status: DISCONTINUED | OUTPATIENT
Start: 2020-02-25 | End: 2020-02-26 | Stop reason: HOSPADM

## 2020-02-25 RX ORDER — OXYBUTYNIN CHLORIDE 5 MG/1
5 TABLET ORAL 3 TIMES DAILY
Status: DISCONTINUED | OUTPATIENT
Start: 2020-02-25 | End: 2020-02-26 | Stop reason: HOSPADM

## 2020-02-25 RX ORDER — LIDOCAINE HYDROCHLORIDE 20 MG/ML
2 JELLY TOPICAL
Status: DISCONTINUED | OUTPATIENT
Start: 2020-02-25 | End: 2020-02-26 | Stop reason: HOSPADM

## 2020-02-25 RX ORDER — POLYETHYLENE GLYCOL 3350 17 G/17G
17 POWDER, FOR SOLUTION ORAL DAILY
Status: DISCONTINUED | OUTPATIENT
Start: 2020-02-25 | End: 2020-02-26 | Stop reason: HOSPADM

## 2020-02-25 RX ADMIN — CETIRIZINE HYDROCHLORIDE 10 MG: 5 TABLET ORAL at 09:02

## 2020-02-25 RX ADMIN — POLYETHYLENE GLYCOL 3350 17 G: 17 POWDER, FOR SOLUTION ORAL at 10:02

## 2020-02-25 RX ADMIN — ACETAMINOPHEN 1000 MG: 500 TABLET ORAL at 06:02

## 2020-02-25 RX ADMIN — OXYBUTYNIN CHLORIDE 5 MG: 5 TABLET ORAL at 03:02

## 2020-02-25 RX ADMIN — LIDOCAINE HYDROCHLORIDE 2 ML: 20 JELLY TOPICAL at 11:02

## 2020-02-25 RX ADMIN — ATORVASTATIN CALCIUM 10 MG: 10 TABLET, FILM COATED ORAL at 09:02

## 2020-02-25 RX ADMIN — HYOSCYAMINE SULFATE 0.12 MG: 0.12 TABLET ORAL; SUBLINGUAL at 11:02

## 2020-02-25 RX ADMIN — OXYBUTYNIN CHLORIDE 5 MG: 5 TABLET ORAL at 09:02

## 2020-02-25 RX ADMIN — ACETAMINOPHEN 1000 MG: 500 TABLET ORAL at 12:02

## 2020-02-25 RX ADMIN — PANTOPRAZOLE SODIUM 40 MG: 40 TABLET, DELAYED RELEASE ORAL at 09:02

## 2020-02-25 RX ADMIN — OXYCODONE HYDROCHLORIDE 5 MG: 5 TABLET ORAL at 11:02

## 2020-02-25 NOTE — PLAN OF CARE
Pt arrived to [facility] s/p angioseal.  NAD noted.  Report received from Cecelia.  JOBY to ATA SOLIMAN.  Will continue to monitor.

## 2020-02-25 NOTE — ASSESSMENT & PLAN NOTE
69 y.o. female with a history of HTN, HLD, GERD, and COPD who underwent a left PCNL on 2/14 for a large renal pelvis stone who presents as a transfer from [facility] on 2/24 due to concern for left renal bleeding. Taken to IR for urgent angiogram on 2/24 - no signs of active bleeding.     - Regular diet  - IVF at 125  - Strict I's/O's  - Drains: 24 Fr 3-way murdock, CBI clamped this am  - Will continue to monitor murdock for bleeding, if remains clear today will attempt removal tomorrow am  - CT showed residual clot still within the kidney therefore will leave stent in place longer than originally planned to allow drainage while these pass/dissolve  - Home meds restarted, hold ASA 81  - Pain control- PRN tylenol and oxycodone 5 mg  - PPx: SCD's/NEFTALI's (no heparin secondary to bleeding risk)  - Dispo: Remain hospitalized    Patient's family very upset and angry regarding the current situation. Long discussion had between patient's sister and Dr. Torres this morning where we explained that bleeding is a significant risk following this procedure (PCNL). We explained that as the angiogram yesterday was negative there is currently no active bleeding taking place in the kidney and if there was bleeding immediately or delayed following the procedure this has now resolved and likely the current hematuria is old clots coming down from the kidney.     The patient has remained stable throughout all of this and has not required any blood transfusions. Hemoglobin is 8.5 this am from 9.5 on discharge over 1 week ago. We explained that while this is not the usual post op course it is not uncommon. Sister is adamant that this is due to the nephrostomy tube being removed on POD 0 vs POD 1 however we explained that the main indication to leave a nephrostomy tube would be to keep the tract open in the event of a second look which we did not feel was necessary. A stent was left in place to ensure adequate drainage of the kidney and  there was no intra-op indication that the patient required a nephrostomy tube. Even so, there is no reason to think pulling the tube on POD 1 would have prevented the current situation.

## 2020-02-25 NOTE — SUBJECTIVE & OBJECTIVE
Interval History:   No acute events overnight  Taken to IR yesterday, no evidence of active bleeding on angiogram  Murdock draining dark yellow this am  Main complaint is pain around the catheter  No dizziness or lightheadedness  Tolerating regular diet  Has not been ambulating     Review of Systems  Objective:     Temp:  [96.5 °F (35.8 °C)-99.1 °F (37.3 °C)] 98.3 °F (36.8 °C)  Pulse:  [74-91] 88  Resp:  [14-22] 16  SpO2:  [92 %-100 %] 92 %  BP: (100-137)/(47-66) 106/61     There is no height or weight on file to calculate BMI.           Drains     Drain                 Urethral Catheter 02/24/20 1354 less than 1 day                Physical Exam   Nursing note and vitals reviewed.  Constitutional: She is oriented to person, place, and time. She appears well-developed and well-nourished. No distress.   HENT:   Head: Normocephalic and atraumatic.   Eyes: Pupils are equal, round, and reactive to light. Right eye exhibits no discharge. Left eye exhibits no discharge.   Cardiovascular: Normal rate and regular rhythm.    Pulmonary/Chest: Effort normal. No respiratory distress.   Abdominal: Soft. She exhibits no distension. There is tenderness (Suprapubic). There is no guarding.   No CVA tenderness   Genitourinary:   Genitourinary Comments: 24 three-way murdock in place, on slow-drip CBI, draining dark yellow   Neurological: She is alert and oriented to person, place, and time.   Skin: Skin is warm. She is not diaphoretic.     Psychiatric: She has a normal mood and affect. Her behavior is normal. Judgment and thought content normal.       Significant Labs:    BMP:  Recent Labs   Lab 02/25/20  0409      K 4.1      CO2 24   BUN 15   CREATININE 0.8   CALCIUM 8.3*       CBC:   Recent Labs   Lab 02/25/20  0409   WBC 7.65   HGB 8.5*   HCT 28.7*          All pertinent labs results from the past 24 hours have been reviewed.    Significant Imaging:  All pertinent imaging results/findings from the past 24 hours have  been reviewed.

## 2020-02-25 NOTE — PLAN OF CARE
AAOx4, ambulated in hallway and sat in chair, CBI d/c and murdock irrigated prn, currently draining clear dark red urine, no c/o pain currently controlled w/ medication, no falls, VS as charted.

## 2020-02-25 NOTE — NURSING
Pt arrived, murdock re secured to leg w/ tape. Murdock bag emptied. MD paged to come and speak w/ family HOB flat until 1900

## 2020-02-25 NOTE — NURSING
MD Ferrer w/ urology stated that if family refuse to clamp CBI at 3am it is okay to keep it open.

## 2020-02-25 NOTE — PROGRESS NOTES
[facility]  Urology  Progress Note    Patient Name: Kaylin Freire  MRN: [id number]  Admission Date: 2/24/2020  Hospital Length of Stay: 1 days  Code Status: Full Code   Attending Provider: True Torres MD   Primary Care Physician: Poly Johnson MD    Subjective:     HPI:  69 y.o. female  with a history of HTN, HLD, GERD, and COPD who underwent a left PCNL on 2/14 for a large renal pelvis stone who presents as a transfer from [facility] on 2/24 due to concern for left renal bleeding. She was admitted yesterday (2/23) to [facility] with clot retention. She underwent a cystoscopy with clot evacuation yesterday and again today. During cystoscopy today, she had lateralizing hematuria from the left UO. A CT abdomen/pelvis with and without contrast was obtained that was notable for fiilling defects throughout the contrast opacified left collecting system consistent with blood clots. Small amount of left perinephric blood and a 7 mm hyperdensity in the posterior lower pole on arterial phase which may be a contrast blush from arterial extravasation versus prominent vessel. She was started on continuous bladder irrigation and murdock has remained crystal clear on a fast drip today. Her Hgb/Hct yesterday was 11.2/32.3 which downtrended to 10.5/31.9 this morning. Her Cr has remained normal. UA on admission was nitrite positive and showed >100 RBC/hpf, 1-2 WBC/hpf, few bacteria, and 1-2 squams. She was started on cipro 400 mg bid.    Interval History:   No acute events overnight  Taken to IR yesterday, no evidence of active bleeding on angiogram  Murdock draining dark yellow this am  Main complaint is pain around the catheter  No dizziness or lightheadedness  Tolerating regular diet  Has not been ambulating     Review of Systems  Objective:     Temp:  [96.5 °F (35.8 °C)-99.1 °F (37.3 °C)] 98.3 °F (36.8 °C)  Pulse:  [74-91] 88  Resp:  [14-22] 16  SpO2:  [92 %-100 %] 92 %  BP: (100-137)/(47-66)  106/61     There is no height or weight on file to calculate BMI.           Drains     Drain                 Urethral Catheter 02/24/20 1354 less than 1 day                Physical Exam   Nursing note and vitals reviewed.  Constitutional: She is oriented to person, place, and time. She appears well-developed and well-nourished. No distress.   HENT:   Head: Normocephalic and atraumatic.   Eyes: Pupils are equal, round, and reactive to light. Right eye exhibits no discharge. Left eye exhibits no discharge.   Cardiovascular: Normal rate and regular rhythm.    Pulmonary/Chest: Effort normal. No respiratory distress.   Abdominal: Soft. She exhibits no distension. There is tenderness (Suprapubic). There is no guarding.   No CVA tenderness   Genitourinary:   Genitourinary Comments: 24 three-way murdock in place, on slow-drip CBI, draining dark yellow   Neurological: She is alert and oriented to person, place, and time.   Skin: Skin is warm. She is not diaphoretic.     Psychiatric: She has a normal mood and affect. Her behavior is normal. Judgment and thought content normal.       Significant Labs:    BMP:  Recent Labs   Lab 02/25/20  0409      K 4.1      CO2 24   BUN 15   CREATININE 0.8   CALCIUM 8.3*       CBC:   Recent Labs   Lab 02/25/20  0409   WBC 7.65   HGB 8.5*   HCT 28.7*          All pertinent labs results from the past 24 hours have been reviewed.    Significant Imaging:  All pertinent imaging results/findings from the past 24 hours have been reviewed.      Assessment/Plan:     * Gross hematuria  69 y.o. female with a history of HTN, HLD, GERD, and COPD who underwent a left PCNL on 2/14 for a large renal pelvis stone who presents as a transfer from [facility] on 2/24 due to concern for left renal bleeding. Taken to IR for urgent angiogram on 2/24 - no signs of active bleeding.     - Regular diet  - IVF at 125  - Strict I's/O's  - Drains: 24 Fr 3-way murdock, CBI clamped this am  - Will  continue to monitor murdock for bleeding, if remains clear today will attempt removal tomorrow am  - CT showed residual clot still within the kidney therefore will leave stent in place longer than originally planned to allow drainage while these pass/dissolve  - Home meds restarted, hold ASA 81  - Pain control- PRN tylenol and oxycodone 5 mg  - PPx: SCD's/NEFTALI's (no heparin secondary to bleeding risk)  - Dispo: Remain hospitalized    Patient's family very upset and angry regarding the current situation. Long discussion had between patient's sister and Dr. Torres this morning where we explained that bleeding is a significant risk following this procedure (PCNL). We explained that as the angiogram yesterday was negative there is currently no active bleeding taking place in the kidney and if there was bleeding immediately or delayed following the procedure this has now resolved and likely the current hematuria is old clots coming down from the kidney.     The patient has remained stable throughout all of this and has not required any blood transfusions. Hemoglobin is 8.5 this am from 9.5 on discharge over 1 week ago. We explained that while this is not the usual post op course it is not uncommon. Sister is adamant that this is due to the nephrostomy tube being removed on POD 0 vs POD 1 however we explained that the main indication to leave a nephrostomy tube would be to keep the tract open in the event of a second look which we did not feel was necessary. A stent was left in place to ensure adequate drainage of the kidney and there was no intra-op indication that the patient required a nephrostomy tube. Even so, there is no reason to think pulling the tube on POD 1 would have prevented the current situation.         VTE Risk Mitigation (From admission, onward)         Ordered     Place NEFTALI hose  Until discontinued      02/24/20 1254     Place sequential compression device  Until discontinued      02/24/20 1254     IP VTE  HIGH RISK PATIENT  Once      02/24/20 8516                Esperanza Lopez MD  Urology  [facility]

## 2020-02-25 NOTE — PROGRESS NOTES
"Urology Update    I was called to bedside at the request of patient's family. Patient's sister at bedside was very angry. She asked to be informed as to what the plan was for her sister. I stated that interventional radiology did not see an obvious bleed in the kidney during her angiogram, so the plan would be to monitor her blood counts and urine output to ensure she was no longer bleeding. I explained that she may have bleed but now it has stopped. The patient's sister was very upset that the bleed was not caught right after her surgery and she was discharged. I explained that she was stable and appeared well at the time of discharge - and I explained that we only send patients home when we believe that they will do well. We do not send patients home anticipating complications and returns to the hospital. We know very little about the timing and severity of the bleed, as she did not require transfusions. I empathized with the patient and her family - it can be scary to see blood coming from the Aceves catheter, and a family member having to return to the hospital. They told me about their 2 hour drive to hospital and about a brother who had  from "being discharged too early." I apologized for their troubles but reiterated that we always try to do the best for our patients and that complications can happen even though we do everything we can to prevent them - that is why we make patients aware of complications when we obtain consent. I encouraged them to try keep a positive attitude while here in order to improve the quality of the patient's stay while in the hospital. They responded by saying, "if you were sorry, you'd bring back my dead brother." I responded saying that all I can do is my best to take of Ms. Freire while she's here. Once she is better, she can receive her care elsewhere if that is what she would like. The sister responded saying that she "would rather die than come back to [facility]." "     Carissa Wray MD

## 2020-02-26 VITALS
SYSTOLIC BLOOD PRESSURE: 115 MMHG | DIASTOLIC BLOOD PRESSURE: 59 MMHG | OXYGEN SATURATION: 98 % | BODY MASS INDEX: 35.45 KG/M2 | WEIGHT: 180.56 LBS | HEIGHT: 60 IN | HEART RATE: 76 BPM | RESPIRATION RATE: 18 BRPM | TEMPERATURE: 98 F

## 2020-02-26 LAB
BASOPHILS # BLD AUTO: 0.03 K/UL (ref 0–0.2)
BASOPHILS NFR BLD: 0.4 % (ref 0–1.9)
DIFFERENTIAL METHOD: ABNORMAL
EOSINOPHIL # BLD AUTO: 0 K/UL (ref 0–0.5)
EOSINOPHIL NFR BLD: 0 % (ref 0–8)
ERYTHROCYTE [DISTWIDTH] IN BLOOD BY AUTOMATED COUNT: 13.2 % (ref 11.5–14.5)
HCT VFR BLD AUTO: 27.6 % (ref 37–48.5)
HGB BLD-MCNC: 8.2 G/DL (ref 12–16)
IMM GRANULOCYTES # BLD AUTO: 0.04 K/UL (ref 0–0.04)
IMM GRANULOCYTES NFR BLD AUTO: 0.5 % (ref 0–0.5)
LYMPHOCYTES # BLD AUTO: 2.5 K/UL (ref 1–4.8)
LYMPHOCYTES NFR BLD: 32.1 % (ref 18–48)
MCH RBC QN AUTO: 27.8 PG (ref 27–31)
MCHC RBC AUTO-ENTMCNC: 29.7 G/DL (ref 32–36)
MCV RBC AUTO: 94 FL (ref 82–98)
MONOCYTES # BLD AUTO: 0.5 K/UL (ref 0.3–1)
MONOCYTES NFR BLD: 6.2 % (ref 4–15)
NEUTROPHILS # BLD AUTO: 4.7 K/UL (ref 1.8–7.7)
NEUTROPHILS NFR BLD: 60.8 % (ref 38–73)
NRBC BLD-RTO: 0 /100 WBC
PLATELET # BLD AUTO: 245 K/UL (ref 150–350)
PMV BLD AUTO: 10.7 FL (ref 9.2–12.9)
RBC # BLD AUTO: 2.95 M/UL (ref 4–5.4)
WBC # BLD AUTO: 7.7 K/UL (ref 3.9–12.7)

## 2020-02-26 PROCEDURE — 85025 COMPLETE CBC W/AUTO DIFF WBC: CPT

## 2020-02-26 PROCEDURE — 36415 COLL VENOUS BLD VENIPUNCTURE: CPT

## 2020-02-26 PROCEDURE — 25000003 PHARM REV CODE 250: Performed by: STUDENT IN AN ORGANIZED HEALTH CARE EDUCATION/TRAINING PROGRAM

## 2020-02-26 PROCEDURE — 99024 POSTOP FOLLOW-UP VISIT: CPT | Mod: ,,, | Performed by: UROLOGY

## 2020-02-26 PROCEDURE — 99024 PR POST-OP FOLLOW-UP VISIT: ICD-10-PCS | Mod: ,,, | Performed by: UROLOGY

## 2020-02-26 RX ORDER — PHENAZOPYRIDINE HYDROCHLORIDE 100 MG/1
100 TABLET, FILM COATED ORAL 3 TIMES DAILY PRN
Qty: 10 TABLET | Refills: 0 | Status: SHIPPED | OUTPATIENT
Start: 2020-02-26 | End: 2020-03-07

## 2020-02-26 RX ORDER — OXYBUTYNIN CHLORIDE 5 MG/1
5 TABLET ORAL 3 TIMES DAILY PRN
Qty: 20 TABLET | Refills: 0 | Status: SHIPPED | OUTPATIENT
Start: 2020-02-26 | End: 2021-02-25

## 2020-02-26 RX ADMIN — PANTOPRAZOLE SODIUM 40 MG: 40 TABLET, DELAYED RELEASE ORAL at 09:02

## 2020-02-26 RX ADMIN — LISINOPRIL 5 MG: 5 TABLET ORAL at 09:02

## 2020-02-26 RX ADMIN — POLYETHYLENE GLYCOL 3350 17 G: 17 POWDER, FOR SOLUTION ORAL at 09:02

## 2020-02-26 RX ADMIN — OXYBUTYNIN CHLORIDE 5 MG: 5 TABLET ORAL at 09:02

## 2020-02-26 NOTE — SUBJECTIVE & OBJECTIVE
Interval History:   No acute events overnight  Aceves draining clear yellow urine, removed on morning rounds   No dizziness or lightheadedness  Eating well    Review of Systems    Objective:     Temp:  [97.5 °F (36.4 °C)-98.3 °F (36.8 °C)] 98 °F (36.7 °C)  Pulse:  [75-88] 75  Resp:  [16-18] 18  SpO2:  [92 %-96 %] 96 %  BP: (105-134)/(56-66) 134/66     Body mass index is 35.26 kg/m².           Drains     Drain                 Urethral Catheter 02/24/20 1354 less than 1 day                Physical Exam   Nursing note and vitals reviewed.  Constitutional: She is oriented to person, place, and time. She appears well-developed and well-nourished. No distress.   HENT:   Head: Normocephalic and atraumatic.   Eyes: Pupils are equal, round, and reactive to light. Right eye exhibits no discharge. Left eye exhibits no discharge.   Cardiovascular: Normal rate and regular rhythm.    Pulmonary/Chest: Effort normal. No respiratory distress.   Abdominal: Soft. She exhibits no distension. There is no tenderness. There is no guarding.   No CVA tenderness   Neurological: She is alert and oriented to person, place, and time.   Skin: Skin is warm. She is not diaphoretic.     Psychiatric: She has a normal mood and affect. Her behavior is normal. Judgment and thought content normal.       Significant Labs:    BMP:  Recent Labs   Lab 02/25/20  0409      K 4.1      CO2 24   BUN 15   CREATININE 0.8   CALCIUM 8.3*       CBC:   Recent Labs   Lab 02/25/20  0409 02/26/20  0421   WBC 7.65 7.70   HGB 8.5* 8.2*   HCT 28.7* 27.6*    245       All pertinent labs results from the past 24 hours have been reviewed.    Significant Imaging:  All pertinent imaging results/findings from the past 24 hours have been reviewed.

## 2020-02-26 NOTE — ASSESSMENT & PLAN NOTE
69 y.o. female with a history of HTN, HLD, GERD, and COPD who underwent a left PCNL on 2/14 for a large renal pelvis stone who presents as a transfer from [facility] on 2/24 due to concern for left renal bleeding. Taken to IR for urgent angiogram on 2/24 - no signs of active bleeding.     - Regular diet  - Strict I's/O's  - Aceves removed, must complete voiding trial   - ambulate  - CT showed residual clot still within the kidney therefore will leave stent in place longer than originally planned to allow drainage while these pass/dissolve  - Home meds restarted, hold ASA 81  - Pain control- PRN tylenol and oxycodone 5 mg  - PPx: SCD's/NEFTALI's (no heparin secondary to bleeding risk)  - Dispo: check later this morning, possible DC today

## 2020-02-26 NOTE — PROGRESS NOTES
[facility]  Urology  Progress Note    Patient Name: Kaylin Freire  MRN: [id number]  Admission Date: 2/24/2020  Hospital Length of Stay: 2 days  Code Status: Full Code   Attending Provider: True Torres MD   Primary Care Physician: Poly Johnson MD    Subjective:     HPI:  69 y.o. female  with a history of HTN, HLD, GERD, and COPD who underwent a left PCNL on 2/14 for a large renal pelvis stone who presents as a transfer from [facility] on 2/24 due to concern for left renal bleeding. She was admitted yesterday (2/23) to [facility] with clot retention. She underwent a cystoscopy with clot evacuation yesterday and again today. During cystoscopy today, she had lateralizing hematuria from the left UO. A CT abdomen/pelvis with and without contrast was obtained that was notable for fiilling defects throughout the contrast opacified left collecting system consistent with blood clots. Small amount of left perinephric blood and a 7 mm hyperdensity in the posterior lower pole on arterial phase which may be a contrast blush from arterial extravasation versus prominent vessel. She was started on continuous bladder irrigation and murdock has remained crystal clear on a fast drip today. Her Hgb/Hct yesterday was 11.2/32.3 which downtrended to 10.5/31.9 this morning. Her Cr has remained normal. UA on admission was nitrite positive and showed >100 RBC/hpf, 1-2 WBC/hpf, few bacteria, and 1-2 squams. She was started on cipro 400 mg bid.    Interval History:   No acute events overnight  Murdock draining clear yellow urine, removed on morning rounds   No dizziness or lightheadedness  Eating well    Review of Systems    Objective:     Temp:  [97.5 °F (36.4 °C)-98.3 °F (36.8 °C)] 98 °F (36.7 °C)  Pulse:  [75-88] 75  Resp:  [16-18] 18  SpO2:  [92 %-96 %] 96 %  BP: (105-134)/(56-66) 134/66     Body mass index is 35.26 kg/m².           Drains     Drain                 Urethral Catheter 02/24/20 1354 less than  1 day                Physical Exam   Nursing note and vitals reviewed.  Constitutional: She is oriented to person, place, and time. She appears well-developed and well-nourished. No distress.   HENT:   Head: Normocephalic and atraumatic.   Eyes: Pupils are equal, round, and reactive to light. Right eye exhibits no discharge. Left eye exhibits no discharge.   Cardiovascular: Normal rate and regular rhythm.    Pulmonary/Chest: Effort normal. No respiratory distress.   Abdominal: Soft. She exhibits no distension. There is no tenderness. There is no guarding.   No CVA tenderness   Neurological: She is alert and oriented to person, place, and time.   Skin: Skin is warm. She is not diaphoretic.     Psychiatric: She has a normal mood and affect. Her behavior is normal. Judgment and thought content normal.       Significant Labs:    BMP:  Recent Labs   Lab 02/25/20  0409      K 4.1      CO2 24   BUN 15   CREATININE 0.8   CALCIUM 8.3*       CBC:   Recent Labs   Lab 02/25/20  0409 02/26/20  0421   WBC 7.65 7.70   HGB 8.5* 8.2*   HCT 28.7* 27.6*    245       All pertinent labs results from the past 24 hours have been reviewed.    Significant Imaging:  All pertinent imaging results/findings from the past 24 hours have been reviewed.      Assessment/Plan:     * Gross hematuria  69 y.o. female with a history of HTN, HLD, GERD, and COPD who underwent a left PCNL on 2/14 for a large renal pelvis stone who presents as a transfer from [facility] on 2/24 due to concern for left renal bleeding. Taken to IR for urgent angiogram on 2/24 - no signs of active bleeding.     - Regular diet  - Strict I's/O's  - Aceves removed, must complete voiding trial   - ambulate  - CT showed residual clot still within the kidney therefore will leave stent in place longer than originally planned to allow drainage while these pass/dissolve  - Home meds restarted, hold ASA 81  - Pain control- PRN tylenol and oxycodone 5 mg  - PPx:  SCD's/NEFTALI's (no heparin secondary to bleeding risk)  - Dispo: check later this morning, possible DC today               VTE Risk Mitigation (From admission, onward)         Ordered     Place NEFTALI hose  Until discontinued      02/24/20 1254     Place sequential compression device  Until discontinued      02/24/20 1254     IP VTE HIGH RISK PATIENT  Once      02/24/20 1254                Carissa Wray MD  Urology  [facility]

## 2020-02-26 NOTE — PLAN OF CARE
Patient lives at home alone. Upon discharge patient will ride home with sister and stay at her home while she is recovering. Independent with ADL'S. Verified PCP and Pharmacy. Anticipate d/c later today.     02/26/20 1032   Discharge Assessment   Assessment Type Discharge Planning Assessment   Confirmed/corrected address and phone number on facesheet? Yes   Assessment information obtained from? Patient   Expected Length of Stay (days) 1   Communicated expected length of stay with patient/caregiver yes   Prior to hospitilization cognitive status: Alert/Oriented   Prior to hospitalization functional status: Independent   Current cognitive status: Alert/Oriented   Current Functional Status: Independent   Facility Arrived From:   (Home)   Lives With alone   Able to Return to Prior Arrangements yes   Is patient able to care for self after discharge? Yes   Who are your caregiver(s) and their phone number(s)?   (Cheyenne Moralez (Sister) [phone])   Patient's perception of discharge disposition home or selfcare   Readmission Within the Last 30 Days no previous admission in last 30 days   Patient currently being followed by outpatient case management? No   Patient currently receives any other outside agency services? No   Equipment Currently Used at Home none   Do you have any problems affording any of your prescribed medications? No   Is the patient taking medications as prescribed? yes   Does the patient have transportation home? Yes   Transportation Anticipated family or friend will provide   Does the patient receive services at the [facility]? No   Discharge Plan A Home with family   Discharge Plan B Home with family   DME Needed Upon Discharge  none   Patient/Family in Agreement with Plan yes

## 2020-02-27 NOTE — DISCHARGE SUMMARY
[facility]  Urology  Discharge Summary      Patient Name: Kaylin Freire  MRN: [id number]  Admission Date: 2/24/2020  Hospital Length of Stay: 2 days  Discharge Date and Time: 2/26/2020  3:09 PM  Attending Physician: True Torres MD   Discharging Provider: Jorge Hussein MD  Primary Care Physician: Poly Johnson MD    HPI:   69 y.o. female  with a history of HTN, HLD, GERD, and COPD who underwent a left PCNL on 2/14 for a large renal pelvis stone who presents as a transfer from [facility] on 2/24 due to concern for left renal bleeding. She was admitted yesterday (2/23) to [facility] with clot retention. She underwent a cystoscopy with clot evacuation yesterday and again today. During cystoscopy today, she had lateralizing hematuria from the left UO. A CT abdomen/pelvis with and without contrast was obtained that was notable for fiilling defects throughout the contrast opacified left collecting system consistent with blood clots. Small amount of left perinephric blood and a 7 mm hyperdensity in the posterior lower pole on arterial phase which may be a contrast blush from arterial extravasation versus prominent vessel. She was started on continuous bladder irrigation and murdock has remained crystal clear on a fast drip today. Her Hgb/Hct yesterday was 11.2/32.3 which downtrended to 10.5/31.9 this morning. Her Cr has remained normal. UA on admission was nitrite positive and showed >100 RBC/hpf, 1-2 WBC/hpf, few bacteria, and 1-2 squams. She was started on cipro 400 mg bid.    * No surgery found *     Indwelling Lines/Drains at time of discharge:   Lines/Drains/Airways     Drain                 Urethral Catheter 02/24/20 1354 2 days                Hospital Course (synopsis of major diagnoses, care, treatment, and services provided during the course of the hospital stay):  Patient [place] [facility] [place] above [place]PI. She underwent angiogram with IR which was unrevealing - no active bleeding to  embolize. She was on CBI which was ultimately titrated down and clamped. The murdock was removed and she was able to void without difficulty. Her hemoglobin remained stable throughout her course. She was ultimately deemed safe for discharge on 2/26/2020  3:09 PM.    Medications and instructions as below.  For more thorough information, please refer to the hospital record and operative report.    Consults:   Consults (From admission, onward)        Status Ordering Provider     Inpatient consult to Interventional Radiology  Once     Provider:  (Not yet assigned)    FABIO Lees          Significant Diagnostic Studies:     Pending Diagnostic Studies:     None          Final Active Diagnoses:    Diagnosis Date Noted POA    PRINCIPAL PROBLEM:  Gross hematuria [R31.0] 02/24/2020 Yes    Acute blood loss anemia [D62] 02/24/2020 Yes      Problems Resolved During this Admission:       Discharged Condition: good    Disposition: Home or Self Care    Follow Up:  Follow-up Information     P Jey Aguiar Jr, MD. Schedule an appointment as soon as possible for a visit in 1 week.    Specialty:  Urology  Why:  stent removal   Contact information:  [address] [place] [address]  [phone]                 Patient Instructions:      Notify your health care provider if you experience any of the following:  temperature >100.4     Notify your health care provider if you experience any of the following:  persistent nausea and vomiting or diarrhea     Notify your health care provider if you experience any of the following:  severe uncontrolled pain     Notify your health care provider if you experience any of the following:  redness, tenderness, or signs of infection (pain, swelling, redness, odor or green/yellow discharge around incision site)     Activity as tolerated     Medications:  Reconciled Home Medications:      Medication List      START taking these medications    oxybutynin 5 MG Tab  Commonly known as:   DITROPAN  Take 1 tablet (5 mg total) by mouth 3 (three) times daily as needed (bladder spasms).     phenazopyridine 100 MG tablet  Commonly known as:  PYRIDIUM  Take 1 tablet (100 mg total) by mouth 3 (three) times daily as needed for Pain (burning with urination).        CONTINUE taking these medications    acetaminophen 500 MG tablet  Commonly known as:  TYLENOL  Take 2 tablets (1,000 mg total) by mouth every 6 (six) hours as needed for Pain.     aspirin 81 MG EC tablet  Commonly known as:  ECOTRIN  Take 81 mg by mouth once daily.     atorvastatin 10 MG tablet  Commonly known as:  LIPITOR  every evening.     cetirizine 10 MG tablet  Commonly known as:  ZYRTEC  Take 10 mg by mouth nightly.     lisinopril 5 MG tablet  Commonly known as:  PRINIVIL,ZESTRIL  once daily.     pantoprazole 40 MG tablet  Commonly known as:  PROTONIX     traMADol 50 mg tablet  Commonly known as:  ULTRAM  Take 1 tablet (50 mg total) by mouth every 6 (six) hours as needed for Pain.            Time spent on the discharge of patient: 15 minutes    Jorge Hussein MD  Urology  [facility]

## 2020-02-28 ENCOUNTER — PATIENT OUTREACH (OUTPATIENT)
Dept: ADMINISTRATIVE | Facility: CLINIC | Age: 70
End: 2020-02-28

## 2020-02-28 NOTE — PATIENT INSTRUCTIONS
Blood in the Urine    Blood in the urine (hematuria) has many possible causes. If it occurs after an injury (such as a car accident or fall), it is most often a sign of bruising to the kidney or bladder. Common causes of blood in the urine include urinary tract infections, kidney stones, inflammation, tumors, or certain other diseases of the kidney or bladder. Menstruation can cause blood to appear in the urine sample, although it is not coming from the urinary tract.  If only a trace amount of blood is present, it will show up on the urine test, even though the urine may be yellow and not pink or red. This may occur with any of the above conditions, as well as heavy exercise or high fever. In this case, your doctor may want to repeat the urine test on another day. This will show if the blood is still present. If it is, then other tests can be done to find out the cause.  Home care  Follow these home care guidelines:  · If your urine does not appear bloody (pink, brown or red) then you do not need to restrict your activity in any way.  · If you can see blood in your urine, rest and avoid heavy exertion until your next exam. Do not use aspirin, blood thinners, or anti-platelet or anti-inflammatory medicines. These include ibuprofen and naproxen. These thin the blood and may increase bleeding.  Follow-up care  Follow up with your healthcare provider, or as advised. If you were injured and had blood in your urine, you should have a repeat urine test in 1 to 2 days. Contact your doctor for this test.  A radiologist will review any X-rays that were taken. You will be told of any new findings that may affect your care.  When to seek medical advice  Call your healthcare provider right away if any of these occur:  · Bright red blood or blood clots in the urine (if you did not have this before)  · Weakness, dizziness or fainting  · New groin, abdominal, or back pain  · Fever of 100.4ºF (38ºC) or higher, or as directed by  your healthcare provider  · Repeated vomiting  · Bleeding from the nose or gums or easy bruising  Date Last Reviewed: 9/1/2016  © 1545-2076 [employer]. [address] [place] [address], [place] [address]. All rights reserved. This information is not intended as a substitute for professional medical care. Always follow your healthcare professional's instructions.

## 2020-02-28 NOTE — PLAN OF CARE
Patient discharged home to care of family on 2/26/20.     02/28/20 1427   Final Note   Assessment Type Final Discharge Note   Anticipated Discharge Disposition Home   What phone number can be called within the next 1-3 days to see how you are doing after discharge?   ([phone])   Hospital Follow Up  Appt(s) scheduled? Yes   Discharge plans and expectations educations in teach back method with documentation complete? Yes   Right Care Referral Info   Post Acute Recommendation No Care

## 2020-10-13 LAB
COMPN STONE: NORMAL
SPECIMEN SOURCE: NORMAL
STONE ANALYSIS IR-IMP: NORMAL

## 2021-02-18 ENCOUNTER — IMMUNIZATION (OUTPATIENT)
Dept: OBSTETRICS AND GYNECOLOGY | Facility: CLINIC | Age: 71
End: 2021-02-18
Payer: MEDICARE

## 2021-02-18 DIAGNOSIS — Z23 NEED FOR VACCINATION: Primary | ICD-10-CM

## 2021-02-18 PROCEDURE — 91300 COVID-19, MRNA, LNP-S, PF, 30 MCG/0.3 ML DOSE VACCINE: CPT | Mod: PBBFAC | Performed by: ANESTHESIOLOGY

## 2021-03-10 ENCOUNTER — IMMUNIZATION (OUTPATIENT)
Dept: OBSTETRICS AND GYNECOLOGY | Facility: CLINIC | Age: 71
End: 2021-03-10
Payer: MEDICARE

## 2021-03-10 DIAGNOSIS — Z23 NEED FOR VACCINATION: Primary | ICD-10-CM

## 2021-03-10 PROCEDURE — 0002A COVID-19, MRNA, LNP-S, PF, 30 MCG/0.3 ML DOSE VACCINE: ICD-10-PCS | Mod: CV19,,, | Performed by: ANESTHESIOLOGY

## 2021-03-10 PROCEDURE — 91300 COVID-19, MRNA, LNP-S, PF, 30 MCG/0.3 ML DOSE VACCINE: ICD-10-PCS | Mod: ,,, | Performed by: ANESTHESIOLOGY

## 2021-03-10 PROCEDURE — 0002A COVID-19, MRNA, LNP-S, PF, 30 MCG/0.3 ML DOSE VACCINE: CPT | Mod: CV19,,, | Performed by: ANESTHESIOLOGY

## 2021-03-10 PROCEDURE — 91300 COVID-19, MRNA, LNP-S, PF, 30 MCG/0.3 ML DOSE VACCINE: CPT | Mod: ,,, | Performed by: ANESTHESIOLOGY

## 2021-09-09 DIAGNOSIS — Z12.31 BREAST CANCER SCREENING BY MAMMOGRAM: Primary | ICD-10-CM

## 2021-09-13 ENCOUNTER — HOSPITAL ENCOUNTER (OUTPATIENT)
Dept: RADIOLOGY | Facility: HOSPITAL | Age: 71
Discharge: HOME | End: 2021-09-13
Attending: SURGERY
Payer: MEDICARE

## 2021-09-13 VITALS — BODY MASS INDEX: 36.32 KG/M2 | HEIGHT: 60 IN | WEIGHT: 185 LBS

## 2021-09-13 DIAGNOSIS — Z12.31 BREAST CANCER SCREENING BY MAMMOGRAM: ICD-10-CM

## 2021-09-13 PROCEDURE — 77067 SCR MAMMO BI INCL CAD: CPT | Mod: TC

## 2021-09-19 DIAGNOSIS — Z12.31 BREAST CANCER SCREENING BY MAMMOGRAM: Primary | ICD-10-CM

## 2021-10-23 ENCOUNTER — HOSPITAL ENCOUNTER (EMERGENCY)
Facility: HOSPITAL | Age: 71
Discharge: HOME | End: 2021-10-23
Attending: EMERGENCY MEDICINE
Payer: MEDICARE

## 2021-10-23 VITALS
RESPIRATION RATE: 18 BRPM | DIASTOLIC BLOOD PRESSURE: 79 MMHG | HEIGHT: 60 IN | TEMPERATURE: 99 F | BODY MASS INDEX: 35.73 KG/M2 | OXYGEN SATURATION: 100 % | HEART RATE: 84 BPM | WEIGHT: 182 LBS | SYSTOLIC BLOOD PRESSURE: 148 MMHG

## 2021-10-23 DIAGNOSIS — L03.114 CELLULITIS OF LEFT UPPER EXTREMITY: Primary | ICD-10-CM

## 2021-10-23 PROCEDURE — 25000003 PHARM REV CODE 250: Performed by: EMERGENCY MEDICINE

## 2021-10-23 PROCEDURE — 63600175 PHARM REV CODE 636 W HCPCS: Performed by: EMERGENCY MEDICINE

## 2021-10-23 PROCEDURE — 99284 EMERGENCY DEPT VISIT MOD MDM: CPT | Mod: 25

## 2021-10-23 PROCEDURE — 90715 TDAP VACCINE 7 YRS/> IM: CPT | Performed by: EMERGENCY MEDICINE

## 2021-10-23 PROCEDURE — 90471 IMMUNIZATION ADMIN: CPT | Performed by: EMERGENCY MEDICINE

## 2021-10-23 RX ORDER — DOXYCYCLINE 100 MG/1
100 CAPSULE ORAL 2 TIMES DAILY
Qty: 14 CAPSULE | Refills: 0 | Status: SHIPPED | OUTPATIENT
Start: 2021-10-23 | End: 2021-10-30

## 2021-10-23 RX ORDER — MUPIROCIN 20 MG/G
1 OINTMENT TOPICAL
Status: COMPLETED | OUTPATIENT
Start: 2021-10-23 | End: 2021-10-23

## 2021-10-23 RX ADMIN — TETANUS TOXOID, REDUCED DIPHTHERIA TOXOID AND ACELLULAR PERTUSSIS VACCINE, ADSORBED 0.5 ML: 5; 2.5; 8; 8; 2.5 SUSPENSION INTRAMUSCULAR at 10:10

## 2021-10-23 RX ADMIN — MUPIROCIN 22 G: 20 OINTMENT TOPICAL at 10:10

## 2021-11-19 ENCOUNTER — IMMUNIZATION (OUTPATIENT)
Dept: OBSTETRICS AND GYNECOLOGY | Facility: CLINIC | Age: 71
End: 2021-11-19
Payer: MEDICARE

## 2021-11-19 DIAGNOSIS — Z23 NEED FOR VACCINATION: Primary | ICD-10-CM

## 2021-11-19 PROCEDURE — 0004A COVID-19, MRNA, LNP-S, PF, 30 MCG/0.3 ML DOSE VACCINE: CPT | Mod: PBBFAC | Performed by: ANESTHESIOLOGY

## 2022-04-22 ENCOUNTER — IMMUNIZATION (OUTPATIENT)
Dept: PRIMARY CARE CLINIC | Facility: CLINIC | Age: 72
End: 2022-04-22
Payer: MEDICARE

## 2022-04-22 DIAGNOSIS — Z23 NEED FOR VACCINATION: Primary | ICD-10-CM

## 2022-04-22 PROCEDURE — 91305 COVID-19, MRNA, LNP-S, PF, 30 MCG/0.3 ML DOSE VACCINE (PFIZER): CPT | Mod: PBBFAC | Performed by: STUDENT IN AN ORGANIZED HEALTH CARE EDUCATION/TRAINING PROGRAM

## 2022-09-15 ENCOUNTER — HOSPITAL ENCOUNTER (OUTPATIENT)
Dept: RADIOLOGY | Facility: HOSPITAL | Age: 72
Discharge: HOME | End: 2022-09-15
Attending: SURGERY
Payer: MEDICARE

## 2022-09-15 VITALS — HEIGHT: 60 IN | WEIGHT: 182 LBS | BODY MASS INDEX: 35.73 KG/M2

## 2022-09-15 DIAGNOSIS — Z12.31 BREAST CANCER SCREENING BY MAMMOGRAM: ICD-10-CM

## 2022-09-15 PROCEDURE — 77067 SCR MAMMO BI INCL CAD: CPT | Mod: TC

## 2022-10-06 DIAGNOSIS — N63.11 MASS OF UPPER OUTER QUADRANT OF RIGHT BREAST: Primary | ICD-10-CM

## 2022-10-12 ENCOUNTER — HOSPITAL ENCOUNTER (OUTPATIENT)
Dept: RADIOLOGY | Facility: HOSPITAL | Age: 72
Discharge: HOME | End: 2022-10-12
Attending: SURGERY
Payer: MEDICARE

## 2022-10-12 DIAGNOSIS — N63.11 MASS OF UPPER OUTER QUADRANT OF RIGHT BREAST: ICD-10-CM

## 2022-10-12 PROCEDURE — 76642 ULTRASOUND BREAST LIMITED: CPT | Mod: TC,RT

## 2022-10-28 ENCOUNTER — IMMUNIZATION (OUTPATIENT)
Dept: PRIMARY CARE CLINIC | Facility: CLINIC | Age: 72
End: 2022-10-28
Payer: MEDICARE

## 2022-10-28 DIAGNOSIS — Z23 NEED FOR VACCINATION: Primary | ICD-10-CM

## 2022-10-28 PROCEDURE — 0124A COVID-19, MRNA, LNP-S, BIVALENT BOOSTER, PF, 30 MCG/0.3 ML DOSE: CPT | Mod: PBBFAC | Performed by: ANESTHESIOLOGY

## 2022-10-28 PROCEDURE — 91312 COVID-19, MRNA, LNP-S, BIVALENT BOOSTER, PF, 30 MCG/0.3 ML DOSE: ICD-10-PCS | Mod: S$GLB,,, | Performed by: ANESTHESIOLOGY

## 2022-10-28 PROCEDURE — 91312 COVID-19, MRNA, LNP-S, BIVALENT BOOSTER, PF, 30 MCG/0.3 ML DOSE: CPT | Mod: S$GLB,,, | Performed by: ANESTHESIOLOGY

## 2023-10-10 DIAGNOSIS — Z12.31 SCREENING MAMMOGRAM FOR HIGH-RISK PATIENT: Primary | ICD-10-CM

## 2023-10-16 ENCOUNTER — HOSPITAL ENCOUNTER (OUTPATIENT)
Dept: RADIOLOGY | Facility: HOSPITAL | Age: 73
Discharge: HOME | End: 2023-10-16
Attending: SURGERY
Payer: MEDICARE

## 2023-10-16 VITALS — WEIGHT: 182 LBS | HEIGHT: 60 IN | BODY MASS INDEX: 35.73 KG/M2

## 2023-10-16 DIAGNOSIS — Z12.31 SCREENING MAMMOGRAM FOR HIGH-RISK PATIENT: ICD-10-CM

## 2023-10-16 PROCEDURE — 77067 SCR MAMMO BI INCL CAD: CPT | Mod: TC

## 2023-11-02 ENCOUNTER — HOSPITAL ENCOUNTER (OUTPATIENT)
Dept: RADIOLOGY | Facility: HOSPITAL | Age: 73
Discharge: HOME | End: 2023-11-02
Attending: SURGERY
Payer: MEDICARE

## 2023-11-02 DIAGNOSIS — R19.7 DIARRHEA, UNSPECIFIED TYPE: ICD-10-CM

## 2023-11-02 DIAGNOSIS — R10.32 LEFT LOWER QUADRANT ABDOMINAL PAIN: ICD-10-CM

## 2023-11-02 DIAGNOSIS — R19.7 DIARRHEA, UNSPECIFIED TYPE: Primary | ICD-10-CM

## 2023-11-02 PROCEDURE — 74019 RADEX ABDOMEN 2 VIEWS: CPT | Mod: TC

## 2024-01-06 ENCOUNTER — HOSPITAL ENCOUNTER (EMERGENCY)
Facility: HOSPITAL | Age: 74
Discharge: HOME | End: 2024-01-06
Attending: EMERGENCY MEDICINE
Payer: MEDICARE

## 2024-01-06 VITALS
SYSTOLIC BLOOD PRESSURE: 132 MMHG | WEIGHT: 181 LBS | BODY MASS INDEX: 35.53 KG/M2 | HEIGHT: 60 IN | RESPIRATION RATE: 16 BRPM | OXYGEN SATURATION: 95 % | HEART RATE: 69 BPM | TEMPERATURE: 98 F | DIASTOLIC BLOOD PRESSURE: 73 MMHG

## 2024-01-06 DIAGNOSIS — M94.0 ACUTE COSTOCHONDRITIS: ICD-10-CM

## 2024-01-06 DIAGNOSIS — R07.89 CHEST WALL PAIN: Primary | ICD-10-CM

## 2024-01-06 LAB
ALBUMIN SERPL BCP-MCNC: 3.8 G/DL (ref 3.5–5.2)
ALP SERPL-CCNC: 89 U/L (ref 55–135)
ALT SERPL W/O P-5'-P-CCNC: 23 U/L (ref 10–44)
ANION GAP SERPL CALC-SCNC: 1 MMOL/L (ref 3–11)
AST SERPL-CCNC: 13 U/L (ref 10–40)
BASOPHILS # BLD AUTO: 0.04 K/UL (ref 0–0.2)
BASOPHILS NFR BLD: 0.8 % (ref 0–1.9)
BILIRUB SERPL-MCNC: 0.5 MG/DL (ref 0.1–1)
BILIRUB UR QL STRIP: NEGATIVE
BUN SERPL-MCNC: 14 MG/DL (ref 8–23)
CALCIUM SERPL-MCNC: 9.7 MG/DL (ref 8.7–10.5)
CHLORIDE SERPL-SCNC: 106 MMOL/L (ref 95–110)
CLARITY UR: CLEAR
CO2 SERPL-SCNC: 31 MMOL/L (ref 23–29)
COLOR UR: YELLOW
CREAT SERPL-MCNC: 0.8 MG/DL (ref 0.5–1.4)
DIFFERENTIAL METHOD BLD: NORMAL
EOSINOPHIL # BLD AUTO: 0.1 K/UL (ref 0–0.5)
EOSINOPHIL NFR BLD: 2.1 % (ref 0–8)
ERYTHROCYTE [DISTWIDTH] IN BLOOD BY AUTOMATED COUNT: 12.6 % (ref 11.5–14.5)
EST. GFR  (NO RACE VARIABLE): >60 ML/MIN/1.73 M^2
GLUCOSE SERPL-MCNC: 108 MG/DL (ref 70–110)
GLUCOSE UR QL STRIP: NEGATIVE
HCT VFR BLD AUTO: 43.9 % (ref 37–48.5)
HGB BLD-MCNC: 14.4 G/DL (ref 12–16)
HGB UR QL STRIP: NEGATIVE
IMM GRANULOCYTES # BLD AUTO: 0.02 K/UL (ref 0–0.04)
IMM GRANULOCYTES NFR BLD AUTO: 0.4 % (ref 0–0.5)
KETONES UR QL STRIP: NEGATIVE
LEUKOCYTE ESTERASE UR QL STRIP: NEGATIVE
LYMPHOCYTES # BLD AUTO: 1.6 K/UL (ref 1–4.8)
LYMPHOCYTES NFR BLD: 29.9 % (ref 18–48)
MCH RBC QN AUTO: 28.9 PG (ref 27–31)
MCHC RBC AUTO-ENTMCNC: 32.8 G/DL (ref 32–36)
MCV RBC AUTO: 88 FL (ref 82–98)
MONOCYTES # BLD AUTO: 0.4 K/UL (ref 0.3–1)
MONOCYTES NFR BLD: 7.2 % (ref 4–15)
NEUTROPHILS # BLD AUTO: 3.2 K/UL (ref 1.8–7.7)
NEUTROPHILS NFR BLD: 59.6 % (ref 38–73)
NITRITE UR QL STRIP: NEGATIVE
NRBC BLD-RTO: 0 /100 WBC
PH UR STRIP: 8 [PH] (ref 5–8)
PLATELET # BLD AUTO: 208 K/UL (ref 150–450)
PMV BLD AUTO: 10.4 FL (ref 9.2–12.9)
POTASSIUM SERPL-SCNC: 4.4 MMOL/L (ref 3.5–5.1)
PROT SERPL-MCNC: 7.3 G/DL (ref 6–8.4)
PROT UR QL STRIP: NEGATIVE
RBC # BLD AUTO: 4.99 M/UL (ref 4–5.4)
SODIUM SERPL-SCNC: 138 MMOL/L (ref 136–145)
SP GR UR STRIP: 1.02 (ref 1–1.03)
TROPONIN I SERPL DL<=0.01 NG/ML-MCNC: <4 PG/ML (ref 0–60)
URN SPEC COLLECT METH UR: NORMAL
UROBILINOGEN UR STRIP-ACNC: 1 EU/DL
WBC # BLD AUTO: 5.31 K/UL (ref 3.9–12.7)

## 2024-01-06 PROCEDURE — 96372 THER/PROPH/DIAG INJ SC/IM: CPT | Performed by: EMERGENCY MEDICINE

## 2024-01-06 PROCEDURE — 81003 URINALYSIS AUTO W/O SCOPE: CPT | Performed by: EMERGENCY MEDICINE

## 2024-01-06 PROCEDURE — 99285 EMERGENCY DEPT VISIT HI MDM: CPT | Mod: 25

## 2024-01-06 PROCEDURE — 63600175 PHARM REV CODE 636 W HCPCS: Performed by: EMERGENCY MEDICINE

## 2024-01-06 PROCEDURE — 93010 ELECTROCARDIOGRAM REPORT: CPT | Mod: ,,, | Performed by: INTERNAL MEDICINE

## 2024-01-06 PROCEDURE — 80053 COMPREHEN METABOLIC PANEL: CPT | Performed by: EMERGENCY MEDICINE

## 2024-01-06 PROCEDURE — 85025 COMPLETE CBC W/AUTO DIFF WBC: CPT | Performed by: EMERGENCY MEDICINE

## 2024-01-06 PROCEDURE — 93005 ELECTROCARDIOGRAM TRACING: CPT

## 2024-01-06 PROCEDURE — 84484 ASSAY OF TROPONIN QUANT: CPT | Performed by: EMERGENCY MEDICINE

## 2024-01-06 RX ORDER — HYDROCODONE BITARTRATE AND ACETAMINOPHEN 5; 325 MG/1; MG/1
1 TABLET ORAL EVERY 8 HOURS PRN
Qty: 12 TABLET | Refills: 0 | Status: SHIPPED | OUTPATIENT
Start: 2024-01-06

## 2024-01-06 RX ORDER — KETOROLAC TROMETHAMINE 30 MG/ML
30 INJECTION, SOLUTION INTRAMUSCULAR; INTRAVENOUS
Status: COMPLETED | OUTPATIENT
Start: 2024-01-06 | End: 2024-01-06

## 2024-01-06 RX ORDER — HYDROMORPHONE HYDROCHLORIDE 1 MG/ML
0.5 INJECTION, SOLUTION INTRAMUSCULAR; INTRAVENOUS; SUBCUTANEOUS
Status: COMPLETED | OUTPATIENT
Start: 2024-01-06 | End: 2024-01-06

## 2024-01-06 RX ADMIN — KETOROLAC TROMETHAMINE 30 MG: 30 INJECTION, SOLUTION INTRAMUSCULAR at 11:01

## 2024-01-06 RX ADMIN — HYDROMORPHONE HYDROCHLORIDE 0.5 MG: 1 INJECTION, SOLUTION INTRAMUSCULAR; INTRAVENOUS; SUBCUTANEOUS at 11:01

## 2024-01-06 NOTE — ED PROVIDER NOTES
Encounter Date: 1/6/2024       History     Chief Complaint   Patient presents with    Flank Pain     Pt reports right flank pain that started last night. Pt reports hx of kidney stones.      73-year-old female complaining of right-sided lateral chest wall pain worse with movement, worse with deep inspiration, worse with palpation that began yesterday.  Denies any trauma.  No shortness of breath.  No fever.  Patient states if she does not move, pain is gone.  No other issues.  No nausea vomiting.  No diaphoresis      Review of patient's allergies indicates:   Allergen Reactions    Penicillins Swelling    Adhesive Rash     Past Medical History:   Diagnosis Date    Acute blood loss anemia 2/24/2020    Allergy     COPD (chronic obstructive pulmonary disease)     General anesthetics causing adverse effect in therapeutic use     Sometimes hard to wake up from anesthesia-following left Rotator cuff sx    GERD (gastroesophageal reflux disease)     High cholesterol     HTN (hypertension)     Kidney stones 2/14/2020    Renal mass      Past Surgical History:   Procedure Laterality Date    APPENDECTOMY      BREAST BIOPSY Right     benign    HYSTERECTOMY      PERCUTANEOUS NEPHROLITHOTOMY Left 2/14/2020    Procedure: NEPHROLITHOTOMY, PERCUTANEOUS;  Surgeon: Arjun Montejo MD;  Location: [facility] OR [address] [place] [address];  Service: Urology;  Laterality: Left;  2hrs gen with regional     ROTATOR CUFF REPAIR Bilateral      Family History   Problem Relation Age of Onset    No Known Problems Mother     No Known Problems Father     No Known Problems Sister     No Known Problems Daughter     No Known Problems Maternal Aunt     No Known Problems Maternal Uncle     No Known Problems Paternal Aunt     No Known Problems Paternal Uncle     No Known Problems Maternal Grandmother     No Known Problems Maternal Grandfather     No Known Problems Paternal Grandmother     No Known Problems Paternal Grandfather     Anesthesia problems Neg Hx     Breast cancer  Neg Hx     Ovarian cancer Neg Hx     BRCA 1/2 Neg Hx      Social History     Tobacco Use    Smoking status: Former     Current packs/day: 1.50     Average packs/day: 1.5 packs/day for 35.0 years (52.5 ttl pk-yrs)     Types: Cigarettes    Smokeless tobacco: Never    Tobacco comments:     quit smoking x7 years ago   Substance Use Topics    Alcohol use: Never    Drug use: Never     Review of Systems   Constitutional:  Negative for fever.   HENT:  Negative for sore throat.    Respiratory:  Negative for shortness of breath.    Cardiovascular:  Negative for chest pain.   Gastrointestinal:  Negative for nausea.   Genitourinary:  Negative for dysuria.   Musculoskeletal:  Negative for back pain.   Skin:  Negative for rash.   Neurological:  Negative for weakness.   Hematological:  Does not bruise/bleed easily.   All other systems reviewed and are negative.      Physical Exam     Initial Vitals [01/06/24 1034]   BP Pulse Resp Temp SpO2   (!) 176/90 90 18 98.1 °F (36.7 °C) 96 %      MAP       --         Physical Exam    Nursing note and vitals reviewed.  Constitutional: She appears well-developed and well-nourished. She is not diaphoretic. No distress.   HENT:   Head: Normocephalic and atraumatic.   Mouth/Throat: Oropharynx is clear and moist.   Eyes: Conjunctivae and EOM are normal. Pupils are equal, round, and reactive to light.   Neck: Neck supple. No tracheal deviation present. No JVD present.   Normal range of motion.  Cardiovascular:  Normal rate, regular rhythm, normal heart sounds and intact distal pulses.           No murmur heard.  Pulmonary/Chest: Breath sounds normal. No stridor. No respiratory distress. She has no wheezes. She has no rhonchi. She has no rales. She exhibits tenderness.   Right lateral chest wall tenderness with palpation, no crepitance, no rash   Abdominal: Abdomen is soft. Bowel sounds are normal. She exhibits no distension and no mass. There is no abdominal tenderness. There is no rebound and no  guarding.   Musculoskeletal:         General: No tenderness or edema. Normal range of motion.      Cervical back: Normal range of motion and neck supple.     Neurological: She is alert and oriented to person, place, and time. She has normal strength. No cranial nerve deficit. GCS score is 15. GCS eye subscore is 4. GCS verbal subscore is 5. GCS motor subscore is 6.   Skin: Skin is warm and dry. Capillary refill takes less than 2 seconds.         ED Course   Procedures  Labs Reviewed   COMPREHENSIVE METABOLIC PANEL - Abnormal; Notable for the following components:       Result Value    CO2 31 (*)     Anion Gap 1 (*)     All other components within normal limits   URINALYSIS, REFLEX TO URINE CULTURE    Narrative:     Preferred Collection Type->Urine, Clean Catch  Specimen Source->Urine   CBC W/ AUTO DIFFERENTIAL   TROPONIN I HIGH SENSITIVITY     EKG Readings: (Independently Interpreted)   Initial Reading: No STEMI. Rhythm: Normal Sinus Rhythm. Heart Rate: 66. Ectopy: No Ectopy. Conduction: Normal and RBBB. ST Segments: Normal ST Segments. T Waves: Normal. Axis: Left Axis Deviation. Clinical Impression: Normal Sinus Rhythm with RBBB       Imaging Results              X-Ray Chest 1 View (In process)                      Medications   HYDROmorphone injection 0.5 mg (0.5 mg Intramuscular Given 1/6/24 1101)   ketorolac injection 30 mg (30 mg Intramuscular Given 1/6/24 1100)     Medical Decision Making  Amount and/or Complexity of Data Reviewed  Labs: ordered.  Radiology: ordered.    Risk  Prescription drug management.               ED Course as of 01/06/24 1110   Sat Jan 06, 2024   1057 Chest x-ray is negative for acute changes [SD]   1109 EKG is normal, clinical exam consistent with right-sided lateral chest wall tenderness that is completely reproducible, laboratory values are unremarkable.  Stable for discharge and follow up to primary care physician [SD]      ED Course User Index  [SD] Tremaine Murray MD                Medical Decision Making:   Differential Diagnosis:   Costochondritis, chest wall pain, musculoskeletal pain,  ED Management:  Pain is completely reproducible             Clinical Impression:  Final diagnoses:  [R07.89] Chest wall pain (Primary)  [M94.0] Acute costochondritis          ED Disposition Condition    Discharge Stable          ED Prescriptions       Medication Sig Dispense Start Date End Date Auth. Provider    HYDROcodone-acetaminophen (NORCO) 5-325 mg per tablet Take 1 tablet by mouth every 8 (eight) hours as needed for Pain. 12 tablet 1/6/2024 -- Tremaine Murray MD          Follow-up Information       Follow up With Specialties Details Why Contact Info Additional Information    Primary care physician  In 2 days       [facility] Emergency Department Emergency Medicine  As needed [address] [facility] [address] [phone] Floor 1             Tremaine Murray MD  01/06/24 111

## 2024-05-13 ENCOUNTER — HOSPITAL ENCOUNTER (EMERGENCY)
Facility: HOSPITAL | Age: 74
Discharge: HOME | End: 2024-05-13
Attending: EMERGENCY MEDICINE
Payer: MEDICARE

## 2024-05-13 VITALS
HEIGHT: 60 IN | OXYGEN SATURATION: 95 % | TEMPERATURE: 98 F | DIASTOLIC BLOOD PRESSURE: 71 MMHG | RESPIRATION RATE: 18 BRPM | WEIGHT: 171 LBS | BODY MASS INDEX: 33.57 KG/M2 | SYSTOLIC BLOOD PRESSURE: 134 MMHG | HEART RATE: 86 BPM

## 2024-05-13 DIAGNOSIS — N30.01 ACUTE CYSTITIS WITH HEMATURIA: Primary | ICD-10-CM

## 2024-05-13 LAB
BACTERIA #/AREA URNS HPF: ABNORMAL /HPF
BILIRUB UR QL STRIP: NEGATIVE
CLARITY UR: ABNORMAL
COLOR UR: YELLOW
GLUCOSE UR QL STRIP: NEGATIVE
HGB UR QL STRIP: ABNORMAL
HYALINE CASTS #/AREA URNS LPF: 1.1 /LPF
KETONES UR QL STRIP: NEGATIVE
LEUKOCYTE ESTERASE UR QL STRIP: ABNORMAL
MICROSCOPIC COMMENT: ABNORMAL
NITRITE UR QL STRIP: NEGATIVE
PH UR STRIP: 6 [PH] (ref 5–8)
PROT UR QL STRIP: ABNORMAL
RBC #/AREA URNS HPF: 18 /HPF (ref 0–4)
SP GR UR STRIP: 1.02 (ref 1–1.03)
SQUAMOUS #/AREA URNS HPF: 3 /HPF
URN SPEC COLLECT METH UR: ABNORMAL
UROBILINOGEN UR STRIP-ACNC: 1 EU/DL
WBC #/AREA URNS HPF: >100 /HPF (ref 0–5)

## 2024-05-13 PROCEDURE — 81000 URINALYSIS NONAUTO W/SCOPE: CPT | Performed by: NURSE PRACTITIONER

## 2024-05-13 PROCEDURE — 87186 SC STD MICRODIL/AGAR DIL: CPT | Mod: 59 | Performed by: NURSE PRACTITIONER

## 2024-05-13 PROCEDURE — 87086 URINE CULTURE/COLONY COUNT: CPT | Performed by: NURSE PRACTITIONER

## 2024-05-13 PROCEDURE — 99284 EMERGENCY DEPT VISIT MOD MDM: CPT

## 2024-05-13 PROCEDURE — 87088 URINE BACTERIA CULTURE: CPT | Performed by: NURSE PRACTITIONER

## 2024-05-13 PROCEDURE — 87184 SC STD DISK METHOD PER PLATE: CPT | Performed by: NURSE PRACTITIONER

## 2024-05-13 PROCEDURE — 87077 CULTURE AEROBIC IDENTIFY: CPT | Performed by: NURSE PRACTITIONER

## 2024-05-13 RX ORDER — PHENAZOPYRIDINE HYDROCHLORIDE 200 MG/1
200 TABLET, FILM COATED ORAL 3 TIMES DAILY
Qty: 6 TABLET | Refills: 0 | Status: SHIPPED | OUTPATIENT
Start: 2024-05-13 | End: 2024-05-15

## 2024-05-13 RX ORDER — SULFAMETHOXAZOLE AND TRIMETHOPRIM 800; 160 MG/1; MG/1
1 TABLET ORAL 2 TIMES DAILY
Qty: 14 TABLET | Refills: 0 | Status: SHIPPED | OUTPATIENT
Start: 2024-05-13 | End: 2024-05-20

## 2024-05-13 NOTE — ED PROVIDER NOTES
"Encounter Date: 5/13/2024       History     Chief Complaint   Patient presents with    Dysuria     Pt stated that she has been experiencing lower abdominal pain with pain/burning with urination. Denied fever / nausea / vomiting.      74 yr old WFM with significant history of UTI and kidney stones presents with dysuria for a few days associated with "bladder spasms" as lower abdominal cramping. No fever or flank pain. Describes burning and frequency.     The history is provided by the patient.     Review of patient's allergies indicates:   Allergen Reactions    Penicillins Swelling    Adhesive Rash     Past Medical History:   Diagnosis Date    Acute blood loss anemia 2/24/2020    Allergy     COPD (chronic obstructive pulmonary disease)     General anesthetics causing adverse effect in therapeutic use     Sometimes hard to wake up from anesthesia-following left Rotator cuff sx    GERD (gastroesophageal reflux disease)     High cholesterol     HTN (hypertension)     Kidney stones 2/14/2020    Renal mass      Past Surgical History:   Procedure Laterality Date    APPENDECTOMY      BREAST BIOPSY Right     benign    HYSTERECTOMY      PERCUTANEOUS NEPHROLITHOTOMY Left 2/14/2020    Procedure: NEPHROLITHOTOMY, PERCUTANEOUS;  Surgeon: Arjun Montejo MD;  Location: [facility] [address] [place] [address];  Service: Urology;  Laterality: Left;  2hrs gen with regional     ROTATOR CUFF REPAIR Bilateral      Family History   Problem Relation Name Age of Onset    No Known Problems Mother      No Known Problems Father      No Known Problems Sister      No Known Problems Daughter      No Known Problems Maternal Aunt      No Known Problems Maternal Uncle      No Known Problems Paternal Aunt      No Known Problems Paternal Uncle      No Known Problems Maternal Grandmother      No Known Problems Maternal Grandfather      No Known Problems Paternal Grandmother      No Known Problems Paternal Grandfather      Anesthesia problems Neg Hx      Breast cancer Neg " Hx      Ovarian cancer Neg Hx      BRCA 1/2 Neg Hx       Social History     Tobacco Use    Smoking status: Former     Current packs/day: 1.50     Average packs/day: 1.5 packs/day for 35.0 years (52.5 ttl pk-yrs)     Types: Cigarettes    Smokeless tobacco: Never    Tobacco comments:     quit smoking x7 years ago   Substance Use Topics    Alcohol use: Never    Drug use: Never     Review of Systems   Genitourinary:  Positive for difficulty urinating, dysuria and frequency.   All other systems reviewed and are negative.      Physical Exam     Initial Vitals   BP Pulse Resp Temp SpO2   05/13/24 0906 05/13/24 0906 05/13/24 0906 05/13/24 0906 05/13/24 0907   134/71 86 18 98.4 °F (36.9 °C) 95 %      MAP       --                Physical Exam    Nursing note and vitals reviewed.  Constitutional: Vital signs are normal. She appears well-developed and well-nourished. She is cooperative.   HENT:   Head: Normocephalic and atraumatic.   Right Ear: External ear normal.   Left Ear: External ear normal.   Nose: Nose normal.   Mouth/Throat: Oropharynx is clear and moist.   Eyes: Conjunctivae are normal.   Neck: Neck supple.   Normal range of motion.  Cardiovascular:  Normal rate, regular rhythm and intact distal pulses.           Pulmonary/Chest: Breath sounds normal.   Abdominal: Abdomen is soft. Bowel sounds are normal.   Musculoskeletal:         General: Normal range of motion.      Cervical back: Normal range of motion and neck supple.     Neurological: She is alert and oriented to person, place, and time. She has normal strength.   Skin: Skin is warm and dry. Capillary refill takes less than 2 seconds.   Psychiatric: She has a normal mood and affect. Her behavior is normal. Judgment and thought content normal.         ED Course   Procedures  Labs Reviewed   URINALYSIS, REFLEX TO URINE CULTURE - Abnormal; Notable for the following components:       Result Value    Appearance, UA Cloudy (*)     Protein, UA 1+ (*)     Occult Blood  UA 1+ (*)     Leukocytes, UA 3+ (*)     All other components within normal limits    Narrative:     Preferred Collection Type->Urine, Clean Catch  Specimen Source->Urine   URINALYSIS MICROSCOPIC - Abnormal; Notable for the following components:    RBC, UA 18 (*)     WBC, UA >100 (*)     Hyaline Casts, UA 1.1 (*)     All other components within normal limits    Narrative:     Preferred Collection Type->Urine, Clean Catch  Specimen Source->Urine   CULTURE, URINE          Imaging Results    None          Medications - No data to display  Medical Decision Making  Dysuria for a few days not associated with fever or hematuria     Differential Dx: UTI, renal calculi, bladder spasms     Will obtain a UA but presently no flank pain. Is significant hematuria will need CT     Amount and/or Complexity of Data Reviewed  Discussion of management or test interpretation with external provider(s): UA with UTI.  Discussed results with pt. Will place on Bactrim and Pyridium as previously tolerated. Culture sent and discussed with pt this will return in 48-72 hrs. Strict return for any fever or worsening symptoms. Voiced understanding. Otherwise follow-up with PCP or primary urology if needed. Dced from ED in stable condition     Risk  Prescription drug management.                                      Clinical Impression:  Final diagnoses:  [N30.01] Acute cystitis with hematuria (Primary)          ED Disposition Condition    Discharge Stable          ED Prescriptions       Medication Sig Dispense Start Date End Date Auth. Provider    sulfamethoxazole-trimethoprim 800-160mg (BACTRIM DS) 800-160 mg Tab Take 1 tablet by mouth 2 (two) times daily. for 7 days 14 tablet 5/13/2024 5/20/2024 Cleve Pascual, [facility]    phenazopyridine (PYRIDIUM) 200 MG tablet Take 1 tablet (200 mg total) by mouth 3 (three) times daily. for 2 days 6 tablet 5/13/2024 5/15/2024 Cleve Pascual NP          Follow-up Information       Follow up With Specialties Details  Why Contact Info    Poly Johnson MD Internal Medicine In 3 days As needed, If symptoms worsen [address] [facility] [place] [address]  [phone]               Cleve Pascual NP  05/13/24 0943

## 2024-05-17 LAB — BACTERIA UR CULT: ABNORMAL

## 2024-10-16 DIAGNOSIS — Z12.31 BREAST CANCER SCREENING BY MAMMOGRAM: Primary | ICD-10-CM

## 2024-10-23 ENCOUNTER — HOSPITAL ENCOUNTER (OUTPATIENT)
Dept: RADIOLOGY | Facility: HOSPITAL | Age: 74
Discharge: HOME | End: 2024-10-23
Attending: SURGERY
Payer: MEDICARE

## 2024-10-23 VITALS — HEIGHT: 60 IN | WEIGHT: 171 LBS | BODY MASS INDEX: 33.57 KG/M2

## 2024-10-23 DIAGNOSIS — Z12.31 BREAST CANCER SCREENING BY MAMMOGRAM: ICD-10-CM

## 2024-10-23 PROCEDURE — 77063 BREAST TOMOSYNTHESIS BI: CPT | Mod: TC

## 2024-10-23 PROCEDURE — 77067 SCR MAMMO BI INCL CAD: CPT | Mod: TC

## 2024-11-21 DIAGNOSIS — K21.9 GASTROESOPHAGEAL REFLUX DISEASE, UNSPECIFIED WHETHER ESOPHAGITIS PRESENT: Primary | ICD-10-CM

## 2024-12-13 ENCOUNTER — HOSPITAL ENCOUNTER (OUTPATIENT)
Dept: RADIOLOGY | Facility: HOSPITAL | Age: 74
Discharge: HOME | End: 2024-12-13
Attending: SURGERY
Payer: MEDICARE

## 2024-12-13 DIAGNOSIS — K21.9 GASTROESOPHAGEAL REFLUX DISEASE, UNSPECIFIED WHETHER ESOPHAGITIS PRESENT: ICD-10-CM

## 2024-12-13 PROCEDURE — A9698 NON-RAD CONTRAST MATERIALNOC: HCPCS | Performed by: SURGERY

## 2024-12-13 PROCEDURE — 74240 X-RAY XM UPR GI TRC 1CNTRST: CPT | Mod: TC

## 2024-12-13 PROCEDURE — 25500020 PHARM REV CODE 255: Performed by: SURGERY

## 2024-12-13 RX ADMIN — BARIUM SULFATE 176 G: 960 POWDER, FOR SUSPENSION ORAL at 09:12

## 2024-12-13 RX ADMIN — BARIUM SULFATE 135 ML: 980 POWDER, FOR SUSPENSION ORAL at 09:12

## (undated) DEVICE — VALVE OLYMPUS SCOPE SUCTION

## (undated) DEVICE — WIRE GUIDE 0.038OLD

## (undated) DEVICE — SEE MEDLINE ITEM 152487

## (undated) DEVICE — TRAY CYSTO BASIN

## (undated) DEVICE — BANDAGE KERLIX P/P 2.25IN STER

## (undated) DEVICE — SEE MEDLINE ITEM 157149

## (undated) DEVICE — SYR 10CC LUER LOCK

## (undated) DEVICE — PROBE LITHOTRIPSY 1.9FR 375CM

## (undated) DEVICE — SUT ETHILON 2-0 PSLX 30IN

## (undated) DEVICE — DRESSING TEGADERM 4.4X5IN

## (undated) DEVICE — SUT 2/0 30IN SILK BLK BRAI

## (undated) DEVICE — SEE MEDLINE ITEM 157117

## (undated) DEVICE — GUIDEWIRE PTFE .038INX145CM

## (undated) DEVICE — SEE L#95700

## (undated) DEVICE — PLUG CATHETER STERILE FOLEY

## (undated) DEVICE — SYR 50ML CATH TIP

## (undated) DEVICE — ADHESIVE MASTISOL VIAL 48/BX

## (undated) DEVICE — SEE MEDLINE ITEM 157128

## (undated) DEVICE — BLADE SURG CARBON STEEL SZ11

## (undated) DEVICE — SEE MEDLINE ITEM 146313

## (undated) DEVICE — DRESSING TRANS 6X8 TEGADERM

## (undated) DEVICE — CATH POLLACK OPEN-END FLEXI-TI

## (undated) DEVICE — APPLICATOR CHLORAPREP ORN 26ML

## (undated) DEVICE — GAUZE SPONGE 4X4 12PLY

## (undated) DEVICE — SET EXTENSION STERILE 30IN

## (undated) DEVICE — SOL IRR NACL .9% 3000ML

## (undated) DEVICE — TRAY FOLEY 16FR INFECTION CONT

## (undated) DEVICE — JELLY LUBRICANT STERILE 4 OZ

## (undated) DEVICE — BASKET PERC N CIRCLE

## (undated) DEVICE — PACK SET UP CONVERTORS

## (undated) DEVICE — SUT VICRYL CTD 2-0 GI 27 SH

## (undated) DEVICE — SYR 50CC LL

## (undated) DEVICE — DRAPE C ARM 42 X 120 10/BX

## (undated) DEVICE — DRESSING N ADH OIL EMUL 3X8

## (undated) DEVICE — DRESSING TRANS 4X4 TEGADERM

## (undated) DEVICE — SET IRR URLGY 2LINE UNIV SPIKE

## (undated) DEVICE — SYR 30CC LUER LOCK